# Patient Record
Sex: MALE
[De-identification: names, ages, dates, MRNs, and addresses within clinical notes are randomized per-mention and may not be internally consistent; named-entity substitution may affect disease eponyms.]

---

## 2017-02-02 ENCOUNTER — APPOINTMENT (OUTPATIENT)
Dept: ELECTROPHYSIOLOGY | Facility: CLINIC | Age: 62
End: 2017-02-02

## 2017-04-24 ENCOUNTER — APPOINTMENT (OUTPATIENT)
Dept: PULMONOLOGY | Facility: CLINIC | Age: 62
End: 2017-04-24

## 2017-04-24 VITALS
DIASTOLIC BLOOD PRESSURE: 71 MMHG | WEIGHT: 132 LBS | BODY MASS INDEX: 18.9 KG/M2 | HEART RATE: 53 BPM | HEIGHT: 70 IN | RESPIRATION RATE: 16 BRPM | TEMPERATURE: 97.2 F | SYSTOLIC BLOOD PRESSURE: 108 MMHG | OXYGEN SATURATION: 99 %

## 2017-04-24 DIAGNOSIS — R42 DIZZINESS AND GIDDINESS: ICD-10-CM

## 2017-04-24 DIAGNOSIS — R07.89 OTHER CHEST PAIN: ICD-10-CM

## 2017-04-24 DIAGNOSIS — Z01.818 ENCOUNTER FOR OTHER PREPROCEDURAL EXAMINATION: ICD-10-CM

## 2017-04-24 RX ORDER — ALBUTEROL SULFATE 90 UG/1
108 (90 BASE) AEROSOL, METERED RESPIRATORY (INHALATION)
Refills: 0 | Status: ACTIVE | COMMUNITY

## 2017-04-28 ENCOUNTER — APPOINTMENT (OUTPATIENT)
Dept: PULMONOLOGY | Facility: CLINIC | Age: 62
End: 2017-04-28

## 2017-07-06 ENCOUNTER — MEDICATION RENEWAL (OUTPATIENT)
Age: 62
End: 2017-07-06

## 2017-07-10 ENCOUNTER — APPOINTMENT (OUTPATIENT)
Dept: ORTHOPEDIC SURGERY | Facility: CLINIC | Age: 62
End: 2017-07-10

## 2017-07-24 ENCOUNTER — APPOINTMENT (OUTPATIENT)
Dept: ORTHOPEDIC SURGERY | Facility: CLINIC | Age: 62
End: 2017-07-24

## 2017-11-06 ENCOUNTER — APPOINTMENT (OUTPATIENT)
Dept: PULMONOLOGY | Facility: CLINIC | Age: 62
End: 2017-11-06
Payer: MEDICAID

## 2017-11-06 VITALS
SYSTOLIC BLOOD PRESSURE: 99 MMHG | RESPIRATION RATE: 16 BRPM | HEIGHT: 70 IN | WEIGHT: 132 LBS | HEART RATE: 60 BPM | TEMPERATURE: 97.4 F | OXYGEN SATURATION: 100 % | BODY MASS INDEX: 18.9 KG/M2 | DIASTOLIC BLOOD PRESSURE: 64 MMHG

## 2017-11-06 DIAGNOSIS — R06.02 SHORTNESS OF BREATH: ICD-10-CM

## 2017-11-06 DIAGNOSIS — K21.9 GASTRO-ESOPHAGEAL REFLUX DISEASE W/OUT ESOPHAGITIS: ICD-10-CM

## 2017-11-06 DIAGNOSIS — I45.6 PRE-EXCITATION SYNDROME: ICD-10-CM

## 2017-11-06 LAB
BASE EXCESS BLDA CALC-SCNC: 2.1
BLOOD GAS COMMENTS ARTERIAL: NORMAL
GAS PNL BLDA: NORMAL
HCO3 BLDA-SCNC: 25.9
HOROWITZ INDEX BLDA+IHG-RTO: NORMAL
PCO2 BLDA: 38.9
PH BLDA: 7.44
PO2 BLDA: 92
SAO2 % BLDA: NORMAL

## 2017-11-06 PROCEDURE — 94060 EVALUATION OF WHEEZING: CPT

## 2017-11-06 PROCEDURE — 36600 WITHDRAWAL OF ARTERIAL BLOOD: CPT | Mod: 59

## 2017-11-06 PROCEDURE — 94727 GAS DIL/WSHOT DETER LNG VOL: CPT

## 2017-11-06 PROCEDURE — 82803 BLOOD GASES ANY COMBINATION: CPT

## 2017-11-06 PROCEDURE — 99215 OFFICE O/P EST HI 40 MIN: CPT | Mod: 25

## 2017-11-06 PROCEDURE — 94729 DIFFUSING CAPACITY: CPT

## 2017-11-06 RX ORDER — LEVALBUTEROL TARTRATE 45 UG/1
45 AEROSOL, METERED ORAL TWICE DAILY
Qty: 1 | Refills: 3 | Status: ACTIVE | COMMUNITY
Start: 2017-11-06 | End: 1900-01-01

## 2017-11-20 ENCOUNTER — APPOINTMENT (OUTPATIENT)
Dept: ORTHOPEDIC SURGERY | Facility: CLINIC | Age: 62
End: 2017-11-20
Payer: MEDICAID

## 2017-11-20 VITALS — DIASTOLIC BLOOD PRESSURE: 71 MMHG | SYSTOLIC BLOOD PRESSURE: 119 MMHG | HEART RATE: 51 BPM

## 2017-11-20 DIAGNOSIS — M51.36 OTHER INTERVERTEBRAL DISC DEGENERATION, LUMBAR REGION: ICD-10-CM

## 2017-11-20 PROCEDURE — 99214 OFFICE O/P EST MOD 30 MIN: CPT

## 2017-12-08 ENCOUNTER — RX RENEWAL (OUTPATIENT)
Age: 62
End: 2017-12-08

## 2018-01-22 ENCOUNTER — APPOINTMENT (OUTPATIENT)
Dept: ORTHOPEDIC SURGERY | Facility: CLINIC | Age: 63
End: 2018-01-22

## 2020-11-20 ENCOUNTER — APPOINTMENT (OUTPATIENT)
Dept: PEDIATRIC ALLERGY IMMUNOLOGY | Facility: CLINIC | Age: 65
End: 2020-11-20
Payer: SELF-PAY

## 2020-11-20 VITALS — HEART RATE: 71 BPM | WEIGHT: 123.39 LBS | BODY MASS INDEX: 17.71 KG/M2

## 2020-11-20 DIAGNOSIS — Z83.79 FAMILY HISTORY OF OTHER DISEASES OF THE DIGESTIVE SYSTEM: ICD-10-CM

## 2020-11-20 DIAGNOSIS — L20.9 ATOPIC DERMATITIS, UNSPECIFIED: ICD-10-CM

## 2020-11-20 DIAGNOSIS — Z56.0 UNEMPLOYMENT, UNSPECIFIED: ICD-10-CM

## 2020-11-20 DIAGNOSIS — Z78.9 OTHER SPECIFIED HEALTH STATUS: ICD-10-CM

## 2020-11-20 PROCEDURE — 99499A: CUSTOM | Mod: NC

## 2020-11-20 RX ORDER — MOMETASONE FUROATE 100 UG/1
100 AEROSOL RESPIRATORY (INHALATION)
Refills: 0 | Status: DISCONTINUED | COMMUNITY
Start: 2017-04-24 | End: 2020-11-20

## 2020-11-20 RX ORDER — MONTELUKAST 10 MG/1
10 TABLET, FILM COATED ORAL
Qty: 30 | Refills: 3 | Status: DISCONTINUED | COMMUNITY
Start: 2017-04-24 | End: 2020-11-20

## 2020-11-20 SDOH — ECONOMIC STABILITY - INCOME SECURITY: UNEMPLOYMENT, UNSPECIFIED: Z56.0

## 2020-11-23 PROBLEM — L20.9 ATOPIC DERMATITIS: Status: ACTIVE | Noted: 2020-11-23

## 2020-11-23 NOTE — REASON FOR VISIT
[Initial Consultation] : an initial consultation for [Abnormal EGD/EoE] : abnormal esophagogastroduodenoscopy and or eosinophilic esophagitis [Congestion] : congestion [Itchy Eyes] : itchy eyes [Asthma] : asthma [Eczema] : eczema [Study Visit] : study visit [FreeTextEntry2] : regurgitation of food

## 2020-11-23 NOTE — REVIEW OF SYSTEMS
[Rhinorrhea] : rhinorrhea [Nasal Congestion] : nasal congestion [Atopic Dermatitis] : atopic dermatitis [Nl] : Genitourinary [Fatigue] : no fatigue [Fever] : no fever [Wgt Loss (___ Lbs)] : no recent weight loss [Dry Eyes] : no dryness ~T of the eyes [Vomiting] : no vomiting [Diarrhea] : no diarrhea [Fainting (Syncope)] : no fainting [Seizure] : no seizures [Easy Bruising] : no tendency for easy bruising [Swollen Glands] : no lymphadenopathy [Jitteriness] : no jitteriness [Recurrent Sinus Infections] : no recurrent sinus infections [Recurrent Ear Infections] : no recurrence or ear infections [FreeTextEntry2] : recent weight issues due to multiple problems.  [FreeTextEntry5] : see HPI [FreeTextEntry6] : see HPI [FreeTextEntry7] : see HPI [FreeTextEntry9] : back pain  [de-identified] : see HPI

## 2020-11-23 NOTE — HISTORY OF PRESENT ILLNESS
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  Patient comes in today to consider enrollment in clinical trial with Perla' Siglec 8 antibody for Eosinophilic Esophagitis.\par There is some evidence of difficulty breathing as far back as he can remember, since the age of at least 22 year or earlier.  This was thought to be asthma that has been under intermittent control since then.  The patient was seen by pulmonology in the past, but has been lost to follow up.  He thinks he was also told that he has chronic obstructive pulmonary disease. He uses Ventolin as needed but does not take a controller medication routinely. There is also associated use of albuterol at least once or twice a week.\par \par There was associated ongoing nasal congestion also as far back as patient can remember and was seen by ENT doctors in the past and was told that he has allergic rhinitis. No use of routine allergy medications is stated. \par \par Mr. Pickard has had pain with eating food has been present since the early 20s.  The patient was seen last by GI in 2007 and had an endoscopy with a doctor at [facility], with an associated diagnosis of achalasia. A pH probe was done but did not show reflux but was diagnosed with achalasia.  Then in 2017, an EGD was done that showed eosinophilic esophagitis. There is associated sensation of early satiety, regurgitation of food and feeling of acid in the throat. Currently, the patient has dysphagia with every meal and there is a sensation of food getting stuck with every meal. However, he had not had any impactions in the recent past. Several foods are limited in the diet because the patient feels the esophageal symptoms are worsened by those foods- listed below\par \par Patient also has nasal polyps that were diagnosed in the past. But no surgical correction has been done. The patient has no problems taking aspirin. Takes TUMS almost every day. \par \par Associated eczema on the foot and ankle is bothersome many times but application of topical emollients helps.  [de-identified] : beans, some type cookies, banana, milk, milk products, soy, nuts, peanut (voluntary), tree nuts (voluntary) [de-identified] : eggs, wheat, fish, shellfish

## 2020-11-23 NOTE — SOCIAL HISTORY
[House] : [unfilled] lives in a house  [Dog] : dog [FreeTextEntry2] : Unemployed, but will start employment imminently. [Smokers in Household] : there are no smokers in the home

## 2020-11-23 NOTE — PHYSICAL EXAM
[Alert] : alert [Well Nourished] : well nourished [No Acute Distress] : no acute distress [Well Developed] : well developed [Normal Pupil & Iris Size/Symmetry] : normal pupil and iris size and symmetry [No Discharge] : no discharge [Sclera Not Icteric] : sclera not icteric [Normal Outer Ear/Nose] : the ears and nose were normal in appearance [No Nasal Discharge] : no nasal discharge [No Thrush] : no thrush [Supple] : the neck was supple [Normal Rate and Effort] : normal respiratory rhythm and effort [No Crackles] : no crackles [No Retractions] : no retractions [Normal Rate] : heart rate was normal  [Normal S1, S2] : normal S1 and S2 [No murmur] : no murmur [Regular Rhythm] : with a regular rhythm [Soft] : abdomen soft [Not Distended] : not distended [Normal Cervical Lymph Nodes] : cervical [Skin Intact] : skin intact  [No Rash] : no rash [No Skin Lesions] : no skin lesions [Alert, Awake, Oriented as Age-Appropriate] : alert, awake, oriented as age appropriate [Conjunctival Erythema] : no conjunctival erythema [Boggy Nasal Turbinates] : no boggy and/or pale nasal turbinates [Posterior Pharyngeal Cobblestoning] : no posterior pharyngeal cobblestoning [Wheezing] : no wheezing was heard [Eczematous Patches] : no eczematous patches [de-identified] : thin

## 2021-01-29 ENCOUNTER — APPOINTMENT (OUTPATIENT)
Dept: PEDIATRIC ALLERGY IMMUNOLOGY | Facility: CLINIC | Age: 66
End: 2021-01-29
Payer: COMMERCIAL

## 2021-01-29 VITALS
OXYGEN SATURATION: 98 % | BODY MASS INDEX: 18.51 KG/M2 | HEART RATE: 65 BPM | WEIGHT: 125 LBS | HEIGHT: 69 IN | SYSTOLIC BLOOD PRESSURE: 107 MMHG | TEMPERATURE: 97.1 F | DIASTOLIC BLOOD PRESSURE: 61 MMHG

## 2021-01-29 DIAGNOSIS — J31.0 CHRONIC RHINITIS: ICD-10-CM

## 2021-01-29 DIAGNOSIS — J44.9 CHRONIC OBSTRUCTIVE PULMONARY DISEASE, UNSPECIFIED: ICD-10-CM

## 2021-01-29 DIAGNOSIS — I45.6 PRE-EXCITATION SYNDROME: ICD-10-CM

## 2021-01-29 DIAGNOSIS — R13.10 DYSPHAGIA, UNSPECIFIED: ICD-10-CM

## 2021-01-29 PROCEDURE — 95004 PERQ TESTS W/ALRGNC XTRCS: CPT

## 2021-01-29 PROCEDURE — 99072 ADDL SUPL MATRL&STAF TM PHE: CPT

## 2021-01-29 PROCEDURE — 99215 OFFICE O/P EST HI 40 MIN: CPT | Mod: 25

## 2021-01-29 RX ORDER — ALBUTEROL SULFATE 90 UG/1
108 (90 BASE) AEROSOL, METERED RESPIRATORY (INHALATION)
Qty: 1 | Refills: 0 | Status: ACTIVE | COMMUNITY
Start: 2021-01-29 | End: 1900-01-01

## 2021-01-29 RX ORDER — CETIRIZINE HYDROCHLORIDE 10 MG/1
10 CAPSULE, LIQUID FILLED ORAL
Qty: 30 | Refills: 2 | Status: ACTIVE | COMMUNITY
Start: 2021-01-29 | End: 1900-01-01

## 2021-01-29 RX ORDER — AZELASTINE HYDROCHLORIDE 137 UG/1
0.1 SPRAY, METERED NASAL TWICE DAILY
Qty: 1 | Refills: 1 | Status: ACTIVE | COMMUNITY
Start: 2021-01-29 | End: 1900-01-01

## 2021-02-01 PROBLEM — I45.6 WOLFF-PARKINSON-WHITE (WPW) SYNDROME: Status: ACTIVE | Noted: 2020-11-20

## 2021-02-01 PROBLEM — J44.9 COPD, MODERATE: Status: ACTIVE | Noted: 2017-04-24

## 2021-02-01 PROBLEM — J31.0 NON-ALLERGIC RHINITIS: Status: ACTIVE | Noted: 2021-02-01

## 2021-02-01 PROBLEM — R13.10 DYSPHAGIA: Status: ACTIVE | Noted: 2020-11-23

## 2021-02-01 NOTE — REVIEW OF SYSTEMS
[Vomiting] : no vomiting [Fainting (Syncope)] : no fainting [Seizure] : no seizures [Atopic Dermatitis] : atopic dermatitis [Nl] : Cardiovascular [FreeTextEntry4] : see HPI [FreeTextEntry6] : asthma [FreeTextEntry7] : see HPI

## 2021-02-01 NOTE — PHYSICAL EXAM
[Alert] : alert [Well Nourished] : well nourished [No Acute Distress] : no acute distress [Well Developed] : well developed [No Discharge] : no discharge [No Photophobia] : no photophobia [Conjunctival Erythema] : no conjunctival erythema [Normal Outer Ear/Nose] : the ears and nose were normal in appearance [No Nasal Discharge] : no nasal discharge [Boggy Nasal Turbinates] : boggy and/or pale nasal turbinates [Supple] : the neck was supple [Normal Rate and Effort] : normal respiratory rhythm and effort [No Crackles] : no crackles [No Retractions] : no retractions [Wheezing] : no wheezing was heard [Normal Rate] : heart rate was normal  [Regular Rhythm] : with a regular rhythm [Skin Intact] : skin intact  [No Rash] : no rash [No clubbing] : no clubbing [No Edema] : no edema [Judgment and Insight Age Appropriate] : judgement and insight is age appropriate [Alert, Awake, Oriented as Age-Appropriate] : alert, awake, oriented as age appropriate [de-identified] : right turbinate with shiny pinkish/bluish diffuse tissue, with no nasal opening -4+; left 3+

## 2021-02-01 NOTE — IMPRESSION
[Allergy Testing Dust Mite] : dust mites [Allergy Testing Mixed Feathers] : feathers [Allergy Testing Cockroach] : cockroach [Allergy Testing Dog] : dog [Allergy Testing Cat] : cat [Allergy Testing Trees] : trees [Allergy Testing Weeds] : weeds [Allergy Testing Grasses] : grasses [] : wheat [________] : [unfilled]

## 2021-02-08 ENCOUNTER — APPOINTMENT (OUTPATIENT)
Dept: FAMILY MEDICINE | Facility: CLINIC | Age: 66
End: 2021-02-08
Payer: COMMERCIAL

## 2021-02-08 VITALS
BODY MASS INDEX: 18.51 KG/M2 | TEMPERATURE: 98 F | HEIGHT: 69 IN | HEART RATE: 62 BPM | DIASTOLIC BLOOD PRESSURE: 70 MMHG | SYSTOLIC BLOOD PRESSURE: 110 MMHG | WEIGHT: 125 LBS | OXYGEN SATURATION: 98 %

## 2021-02-08 DIAGNOSIS — R32 UNSPECIFIED URINARY INCONTINENCE: ICD-10-CM

## 2021-02-08 DIAGNOSIS — R21 RASH AND OTHER NONSPECIFIC SKIN ERUPTION: ICD-10-CM

## 2021-02-08 DIAGNOSIS — K59.00 CONSTIPATION, UNSPECIFIED: ICD-10-CM

## 2021-02-08 DIAGNOSIS — J45.909 UNSPECIFIED ASTHMA, UNCOMPLICATED: ICD-10-CM

## 2021-02-08 DIAGNOSIS — H61.23 IMPACTED CERUMEN, BILATERAL: ICD-10-CM

## 2021-02-08 DIAGNOSIS — N52.9 MALE ERECTILE DYSFUNCTION, UNSPECIFIED: ICD-10-CM

## 2021-02-08 PROCEDURE — 99072 ADDL SUPL MATRL&STAF TM PHE: CPT

## 2021-02-08 PROCEDURE — 99204 OFFICE O/P NEW MOD 45 MIN: CPT

## 2021-02-08 RX ORDER — OMEPRAZOLE 40 MG/1
40 CAPSULE, DELAYED RELEASE ORAL
Qty: 30 | Refills: 3 | Status: ACTIVE | COMMUNITY
Start: 2021-02-08 | End: 1900-01-01

## 2021-02-08 NOTE — PLAN
[FreeTextEntry1] : Cerumen impaction\par - irrigation performed BL. Pt tolerated well\par \par GERD\par - start omeprazole\par - recommend GI f/u for EGD\par - also due for colonoscopy\par \par Asthma\par - slight wheeze noted\par - triggered by cold\par - albuterol PRN\par \par Foot rash\par - podiatry referral\par \par Nasal polyps\par - ENT referral\par \par Constipation\par - trial of miralax\par - increase fiber intake\par \par Incontinence/ ED\par - urology referral\par \par Advised pt to f/u more frequently with PCP as it is difficult to address multiple issues during one visit. He is agreeable. \par

## 2021-02-08 NOTE — HISTORY OF PRESENT ILLNESS
[FreeTextEntry8] : 65 year old male presents to establish care with multiple concerns today.\par He complains of clogged ears and difficulty hearing. \par He complains of rash on his feet. Has tried lamisil which has not helped. States rash is itchy, not painful.\par He complains of nasal polyps. Has been followed by allergist. States they worsen in the cold. It has been suggested he get surgery done to remove them but he is hesitant.\par He also complains of chronic GERD and states that worsens the nasal polyps. He is not currently on reflux medication. He has not seen a GI doctor recently. Last colonoscopy 12 years ago. \par He also complains of constipation. Has chronic constipation and states eating healthy foods/vegetables help \par He complains of urinary incontinence and ED

## 2021-02-08 NOTE — PHYSICAL EXAM
[No Acute Distress] : no acute distress [Well-Appearing] : well-appearing [Normal Sclera/Conjunctiva] : normal sclera/conjunctiva [PERRL] : pupils equal round and reactive to light [Normal Outer Ear/Nose] : the outer ears and nose were normal in appearance [Normal TMs] : both tympanic membranes were normal [No Respiratory Distress] : no respiratory distress  [No Accessory Muscle Use] : no accessory muscle use [Normal Rate] : normal rate  [Regular Rhythm] : with a regular rhythm [Soft] : abdomen soft [Non Tender] : non-tender [Non-distended] : non-distended [Normal Bowel Sounds] : normal bowel sounds [No Rash] : no rash [Normal Affect] : the affect was normal [Normal Insight/Judgement] : insight and judgment were intact [de-identified] : slight wheeze BL in upper airways

## 2021-02-23 DIAGNOSIS — R89.9 UNSPECIFIED ABNORMAL FINDING IN SPECIMENS FROM OTHER ORGANS, SYSTEMS AND TISSUES: ICD-10-CM

## 2021-02-23 LAB
BASOPHILS # BLD AUTO: 0.04 K/UL
BASOPHILS NFR BLD AUTO: 0.7 %
DEPRECATED KAPPA LC FREE/LAMBDA SER: 1.27 RATIO
EOSINOPHIL # BLD AUTO: 0.19 K/UL
EOSINOPHIL NFR BLD AUTO: 3.4 %
HCT VFR BLD CALC: 39.6 %
HGB BLD-MCNC: 12.6 G/DL
IGA SER QL IEP: 228 MG/DL
IGG SER QL IEP: 1145 MG/DL
IGM SER QL IEP: 55 MG/DL
IMM GRANULOCYTES NFR BLD AUTO: 0.2 %
KAPPA LC CSF-MCNC: 1.26 MG/DL
KAPPA LC SERPL-MCNC: 1.6 MG/DL
LEUKOTRIENE E4, URINE: 108 CD:455662145
LYMPHOCYTES # BLD AUTO: 1.5 K/UL
LYMPHOCYTES NFR BLD AUTO: 26.5 %
MAN DIFF?: NORMAL
MANNAN BINDING LECTIN (MBL): >4000 NG/ML
MCHC RBC-ENTMCNC: 30.1 PG
MCHC RBC-ENTMCNC: 31.8 GM/DL
MCV RBC AUTO: 94.7 FL
MONOCYTES # BLD AUTO: 0.47 K/UL
MONOCYTES NFR BLD AUTO: 8.3 %
NEUTROPHILS # BLD AUTO: 3.44 K/UL
NEUTROPHILS NFR BLD AUTO: 60.9 %
PLATELET # BLD AUTO: 172 K/UL
RBC # BLD: 4.18 M/UL
RBC # FLD: 13.4 %
TOTAL IGE SMQN RAST: 78 KU/L
TRYPTASE: 5.8 UG/L
WBC # FLD AUTO: 5.65 K/UL

## 2021-03-02 ENCOUNTER — RX RENEWAL (OUTPATIENT)
Age: 66
End: 2021-03-02

## 2021-03-02 RX ORDER — ALBUTEROL SULFATE 90 UG/1
108 (90 BASE) INHALANT RESPIRATORY (INHALATION)
Qty: 1 | Refills: 0 | Status: ACTIVE | COMMUNITY
Start: 2021-03-02 | End: 1900-01-01

## 2021-03-11 ENCOUNTER — APPOINTMENT (OUTPATIENT)
Dept: UROLOGY | Facility: CLINIC | Age: 66
End: 2021-03-11

## 2021-03-22 ENCOUNTER — TRANSCRIPTION ENCOUNTER (OUTPATIENT)
Age: 66
End: 2021-03-22

## 2021-05-03 ENCOUNTER — TRANSCRIPTION ENCOUNTER (OUTPATIENT)
Age: 66
End: 2021-05-03

## 2021-05-05 ENCOUNTER — TRANSCRIPTION ENCOUNTER (OUTPATIENT)
Age: 66
End: 2021-05-05

## 2021-05-07 ENCOUNTER — NON-APPOINTMENT (OUTPATIENT)
Age: 66
End: 2021-05-07

## 2021-05-07 ENCOUNTER — APPOINTMENT (OUTPATIENT)
Dept: ELECTROPHYSIOLOGY | Facility: CLINIC | Age: 66
End: 2021-05-07
Payer: COMMERCIAL

## 2021-05-07 VITALS
DIASTOLIC BLOOD PRESSURE: 69 MMHG | HEIGHT: 69 IN | HEART RATE: 58 BPM | BODY MASS INDEX: 17.77 KG/M2 | SYSTOLIC BLOOD PRESSURE: 115 MMHG | WEIGHT: 120 LBS | OXYGEN SATURATION: 98 %

## 2021-05-07 DIAGNOSIS — Z87.09 PERSONAL HISTORY OF OTHER DISEASES OF THE RESPIRATORY SYSTEM: ICD-10-CM

## 2021-05-07 DIAGNOSIS — R55 SYNCOPE AND COLLAPSE: ICD-10-CM

## 2021-05-07 DIAGNOSIS — R00.2 PALPITATIONS: ICD-10-CM

## 2021-05-07 DIAGNOSIS — I49.9 CARDIAC ARRHYTHMIA, UNSPECIFIED: ICD-10-CM

## 2021-05-07 DIAGNOSIS — K22.0 ACHALASIA OF CARDIA: ICD-10-CM

## 2021-05-07 PROCEDURE — 99204 OFFICE O/P NEW MOD 45 MIN: CPT

## 2021-05-08 PROBLEM — K22.0 ACHALASIA: Status: RESOLVED | Noted: 2017-04-24 | Resolved: 2020-11-20

## 2021-05-08 NOTE — PHYSICAL EXAM
[Normal Conjunctiva] : normal conjunctiva [No Xanthelasma] : no xanthelasma [Normal Venous Pressure] : normal venous pressure [No Carotid Bruit] : no carotid bruit [Normal S1, S2] : normal S1, S2 [No Murmur] : no murmur [Clear Lung Fields] : clear lung fields [Good Air Entry] : good air entry [Soft] : abdomen soft [Non Tender] : non-tender [Normal Gait] : normal gait [No Edema] : no edema [No Cyanosis] : no cyanosis [No Clubbing] : no clubbing [No Rash] : no rash [No Skin Lesions] : no skin lesions [Moves all extremities] : moves all extremities [No Focal Deficits] : no focal deficits [Alert and Oriented] : alert and oriented [de-identified] : Very lean [de-identified] : Pectus

## 2021-05-08 NOTE — DISCUSSION/SUMMARY
[FreeTextEntry1] : He may have either SVT, PAF or both. Idiopathic VT possible but less likely. Contrary to previous evaluations, I have not seen any evidence for WPW from the records available to me. I have given him a Biotel monitor to better make a diagnosis. He has normal carotid upstrokes and no significant murmur, and is unlikely to have significant structural heart disease. Resting pulse is 60 so not much room for AA agents. Once a definitive diagnosis is made, catheter ablation will likely be the best theraapeutic option.

## 2021-05-08 NOTE — REVIEW OF SYSTEMS
[Fever] : no fever [Headache] : no headache [Chills] : no chills [Feeling Fatigued] : not feeling fatigued [Weight Loss (___ Lbs)] : [unfilled] ~Ulb weight loss [Blurry Vision] : no blurred vision [Seeing Double (Diplopia)] : no diplopia [Earache] : no earache [SOB] : no shortness of breath [Dyspnea on exertion] : not dyspnea during exertion [Chest Discomfort] : no chest discomfort [Lower Ext Edema] : no extremity edema [Leg Claudication] : no intermittent leg claudication [Palpitations] : palpitations [Orthopnea] : no orthopnea [Syncope] : no syncope [Cough] : no cough [Wheezing] : wheezing [Coughing Up Blood] : no hemoptysis [Abdominal Pain] : no abdominal pain [Nausea] : no nausea [Change in Appetite] : change in appetite [Change In The Stool] : no change in stool [Constipation] : no constipation [Blood in stool] : no blood in stoo [Urinary Frequency] : no change in urinary frequency [Joint Pain] : no joint pain [Hematuria] : no hematuria [Joint Swelling] : no joint swelling [Rash] : no rash [Skin Lesions] : no skin lesions [Dizziness] : dizziness [Convulsions] : no convulsions [Limb Weakness (Paresis)] : no limb weakness (Paresis) [Confusion] : no confusion was observed [Memory Lapses Or Loss] : no memory lapses or loss [Under Stress] : not under stress [Easy Bleeding] : no tendency for easy bleeding

## 2021-05-12 LAB
2,3-DINOR 11B-PROSTAGLANDIN F2A, 24 HR URINE: 1661 CD:455662145
COLLECT DURATION TIME UR: 24 H
CREAT UR-MCNC: 63 MG/DL
CREATININE, 24 HOUR URINE: 1859 MG/24 H
ME-HISTAMINE/CREAT 24H UR: 146 MCG/G CR
SPECIMEN VOL 24H UR: 2950 ML

## 2021-05-19 ENCOUNTER — APPOINTMENT (OUTPATIENT)
Dept: FAMILY MEDICINE | Facility: CLINIC | Age: 66
End: 2021-05-19

## 2021-05-24 ENCOUNTER — APPOINTMENT (OUTPATIENT)
Dept: CARDIOLOGY | Facility: CLINIC | Age: 66
End: 2021-05-24

## 2021-06-03 ENCOUNTER — APPOINTMENT (OUTPATIENT)
Dept: CARDIOLOGY | Facility: CLINIC | Age: 66
End: 2021-06-03

## 2021-06-07 PROCEDURE — 93228 REMOTE 30 DAY ECG REV/REPORT: CPT

## 2021-08-19 ENCOUNTER — APPOINTMENT (OUTPATIENT)
Dept: PEDIATRIC ALLERGY IMMUNOLOGY | Facility: CLINIC | Age: 66
End: 2021-08-19
Payer: MEDICAID

## 2021-08-19 PROCEDURE — 99215 OFFICE O/P EST HI 40 MIN: CPT | Mod: 95

## 2021-08-24 NOTE — HISTORY OF PRESENT ILLNESS
[Home] : at home, [unfilled] , at the time of the visit. [Other Location: e.g. Home (Enter Location, City,State)___] : at [unfilled] [de-identified] : Patient feels he is worse than he used to be.\par Dizziness- has been dizzy here and there for the last several days although this has been present for many years- was present in 2016 when was evaluated for it as well. The patient has spoke to his PCP and work up has included Cardiology.  \par Shortness of breath (SOB)- also present for many years, but feels bothersome now.  Seen by Pulmonology in the past, but has not discussed this recently with them. Walking at a slow pace was associated with SOB. Seen last by Cardiology several years ago.\par However, patient is able to play tennis routinely without a problem.\par Palpitations have also occurred here and there for a long time\par 18 years ago, was seen by electro-cardiologist from [facility] and "heart monitor" was placed and no issues were identified. \par Patient also states that he also has shortness of breath when he lays down.  He feels that there is heart discomfort. \par Patient also states that he was told he has achalasia, reflux and dysphagia with history of having endoscopy in the past. In the past, EGD found some H.Pylori but that was a while ago.  When last seen at [facility], a manometry was recommended and patient states that this testing showed evidence of Achalasia and patient was treated with PPIs, which were not helpful. Another doctor has recommended an evaluation of reflux. \par \par

## 2021-08-24 NOTE — REVIEW OF SYSTEMS
[Nl] : Integumentary [Eye Discharge] : no eye discharge [Puffy Eyelids] : no puffy ~T eyelids [FreeTextEntry4] : see HPI [FreeTextEntry5] : see HPI [FreeTextEntry6] : see HPI [FreeTextEntry7] : see HPI

## 2021-08-24 NOTE — PHYSICAL EXAM
[Alert] : alert [No Acute Distress] : no acute distress [Well Developed] : well developed [No Discharge] : no discharge [Normal Outer Ear/Nose] : the ears and nose were normal in appearance [No Nasal Discharge] : no nasal discharge [Judgment and Insight Age Appropriate] : judgement and insight is age appropriate [Alert, Awake, Oriented as Age-Appropriate] : alert, awake, oriented as age appropriate [Conjunctival Erythema] : no conjunctival erythema

## 2021-08-25 ENCOUNTER — INPATIENT (INPATIENT)
Facility: HOSPITAL | Age: 66
LOS: 2 days | Discharge: HOME | DRG: 392 | End: 2021-08-28
Attending: STUDENT IN AN ORGANIZED HEALTH CARE EDUCATION/TRAINING PROGRAM | Admitting: STUDENT IN AN ORGANIZED HEALTH CARE EDUCATION/TRAINING PROGRAM
Payer: MEDICAID

## 2021-08-25 VITALS
RESPIRATION RATE: 18 BRPM | HEART RATE: 62 BPM | OXYGEN SATURATION: 97 % | DIASTOLIC BLOOD PRESSURE: 79 MMHG | SYSTOLIC BLOOD PRESSURE: 151 MMHG | HEIGHT: 65 IN | WEIGHT: 149.91 LBS | TEMPERATURE: 98 F

## 2021-08-25 DIAGNOSIS — Z01.818 ENCOUNTER FOR OTHER PREPROCEDURAL EXAMINATION: Chronic | ICD-10-CM

## 2021-08-25 DIAGNOSIS — Z98.890 OTHER SPECIFIED POSTPROCEDURAL STATES: Chronic | ICD-10-CM

## 2021-08-25 LAB
ALBUMIN SERPL ELPH-MCNC: 4.3 G/DL — SIGNIFICANT CHANGE UP (ref 3.3–5)
ALP SERPL-CCNC: 65 U/L — SIGNIFICANT CHANGE UP (ref 40–120)
ALT FLD-CCNC: 18 U/L — SIGNIFICANT CHANGE UP (ref 10–45)
ANION GAP SERPL CALC-SCNC: 12 MMOL/L — SIGNIFICANT CHANGE UP (ref 5–17)
AST SERPL-CCNC: 29 U/L — SIGNIFICANT CHANGE UP (ref 10–40)
BASE EXCESS BLDV CALC-SCNC: 5.7 MMOL/L — HIGH (ref -2–2)
BASOPHILS # BLD AUTO: 0.05 K/UL — SIGNIFICANT CHANGE UP (ref 0–0.2)
BASOPHILS NFR BLD AUTO: 0.8 % — SIGNIFICANT CHANGE UP (ref 0–2)
BILIRUB SERPL-MCNC: 0.4 MG/DL — SIGNIFICANT CHANGE UP (ref 0.2–1.2)
BUN SERPL-MCNC: 15 MG/DL — SIGNIFICANT CHANGE UP (ref 7–23)
CA-I SERPL-SCNC: 1.27 MMOL/L — SIGNIFICANT CHANGE UP (ref 1.15–1.33)
CALCIUM SERPL-MCNC: 9.7 MG/DL — SIGNIFICANT CHANGE UP (ref 8.4–10.5)
CHLORIDE BLDV-SCNC: 102 MMOL/L — SIGNIFICANT CHANGE UP (ref 96–108)
CHLORIDE SERPL-SCNC: 103 MMOL/L — SIGNIFICANT CHANGE UP (ref 96–108)
CO2 BLDV-SCNC: 35 MMOL/L — HIGH (ref 22–26)
CO2 SERPL-SCNC: 24 MMOL/L — SIGNIFICANT CHANGE UP (ref 22–31)
CREAT SERPL-MCNC: 0.74 MG/DL — SIGNIFICANT CHANGE UP (ref 0.5–1.3)
EOSINOPHIL # BLD AUTO: 0.24 K/UL — SIGNIFICANT CHANGE UP (ref 0–0.5)
EOSINOPHIL NFR BLD AUTO: 3.7 % — SIGNIFICANT CHANGE UP (ref 0–6)
GAS PNL BLDV: 138 MMOL/L — SIGNIFICANT CHANGE UP (ref 136–145)
GAS PNL BLDV: SIGNIFICANT CHANGE UP
GAS PNL BLDV: SIGNIFICANT CHANGE UP
GLUCOSE BLDV-MCNC: 95 MG/DL — SIGNIFICANT CHANGE UP (ref 70–99)
GLUCOSE SERPL-MCNC: 92 MG/DL — SIGNIFICANT CHANGE UP (ref 70–99)
HCO3 BLDV-SCNC: 33 MMOL/L — HIGH (ref 22–29)
HCT VFR BLD CALC: 39.4 % — SIGNIFICANT CHANGE UP (ref 39–50)
HCT VFR BLDA CALC: 40 % — SIGNIFICANT CHANGE UP (ref 39–51)
HGB BLD CALC-MCNC: 13.2 G/DL — SIGNIFICANT CHANGE UP (ref 12.6–17.4)
HGB BLD-MCNC: 12.8 G/DL — LOW (ref 13–17)
IMM GRANULOCYTES NFR BLD AUTO: 0.2 % — SIGNIFICANT CHANGE UP (ref 0–1.5)
LACTATE BLDV-MCNC: 0.8 MMOL/L — SIGNIFICANT CHANGE UP (ref 0.7–2)
LIDOCAIN IGE QN: 37 U/L — SIGNIFICANT CHANGE UP (ref 7–60)
LYMPHOCYTES # BLD AUTO: 2.06 K/UL — SIGNIFICANT CHANGE UP (ref 1–3.3)
LYMPHOCYTES # BLD AUTO: 32.1 % — SIGNIFICANT CHANGE UP (ref 13–44)
MAGNESIUM SERPL-MCNC: 2.4 MG/DL — SIGNIFICANT CHANGE UP (ref 1.6–2.6)
MCHC RBC-ENTMCNC: 30.1 PG — SIGNIFICANT CHANGE UP (ref 27–34)
MCHC RBC-ENTMCNC: 32.5 GM/DL — SIGNIFICANT CHANGE UP (ref 32–36)
MCV RBC AUTO: 92.7 FL — SIGNIFICANT CHANGE UP (ref 80–100)
MONOCYTES # BLD AUTO: 0.53 K/UL — SIGNIFICANT CHANGE UP (ref 0–0.9)
MONOCYTES NFR BLD AUTO: 8.3 % — SIGNIFICANT CHANGE UP (ref 2–14)
NEUTROPHILS # BLD AUTO: 3.52 K/UL — SIGNIFICANT CHANGE UP (ref 1.8–7.4)
NEUTROPHILS NFR BLD AUTO: 54.9 % — SIGNIFICANT CHANGE UP (ref 43–77)
NRBC # BLD: 0 /100 WBCS — SIGNIFICANT CHANGE UP (ref 0–0)
NT-PROBNP SERPL-SCNC: 163 PG/ML — SIGNIFICANT CHANGE UP (ref 0–300)
PCO2 BLDV: 58 MMHG — HIGH (ref 42–55)
PH BLDV: 7.36 — SIGNIFICANT CHANGE UP (ref 7.32–7.43)
PHOSPHATE SERPL-MCNC: 3.8 MG/DL — SIGNIFICANT CHANGE UP (ref 2.5–4.5)
PLATELET # BLD AUTO: 145 K/UL — LOW (ref 150–400)
PO2 BLDV: 21 MMHG — LOW (ref 25–45)
POTASSIUM BLDV-SCNC: 3.7 MMOL/L — SIGNIFICANT CHANGE UP (ref 3.5–5.1)
POTASSIUM SERPL-MCNC: 3.8 MMOL/L — SIGNIFICANT CHANGE UP (ref 3.5–5.3)
POTASSIUM SERPL-SCNC: 3.8 MMOL/L — SIGNIFICANT CHANGE UP (ref 3.5–5.3)
PROT SERPL-MCNC: 7 G/DL — SIGNIFICANT CHANGE UP (ref 6–8.3)
RBC # BLD: 4.25 M/UL — SIGNIFICANT CHANGE UP (ref 4.2–5.8)
RBC # FLD: 13.9 % — SIGNIFICANT CHANGE UP (ref 10.3–14.5)
SAO2 % BLDV: 28.6 % — LOW (ref 67–88)
SODIUM SERPL-SCNC: 139 MMOL/L — SIGNIFICANT CHANGE UP (ref 135–145)
TROPONIN T, HIGH SENSITIVITY RESULT: 8 NG/L — SIGNIFICANT CHANGE UP (ref 0–51)
WBC # BLD: 6.41 K/UL — SIGNIFICANT CHANGE UP (ref 3.8–10.5)
WBC # FLD AUTO: 6.41 K/UL — SIGNIFICANT CHANGE UP (ref 3.8–10.5)

## 2021-08-25 PROCEDURE — 71046 X-RAY EXAM CHEST 2 VIEWS: CPT | Mod: 26

## 2021-08-25 RX ORDER — FAMOTIDINE 10 MG/ML
20 INJECTION INTRAVENOUS ONCE
Refills: 0 | Status: COMPLETED | OUTPATIENT
Start: 2021-08-25 | End: 2021-08-25

## 2021-08-25 RX ADMIN — FAMOTIDINE 20 MILLIGRAM(S): 10 INJECTION INTRAVENOUS at 23:22

## 2021-08-25 RX ADMIN — Medication 30 MILLILITER(S): at 23:22

## 2021-08-25 NOTE — ED ADULT NURSE NOTE - TEMPLATE LIST FOR HEAD TO TOE ASSESSMENT
Marleni from [facility] called stating patient was seen in the ER earlier today, and is now back at the facility. There were no changes made except for medication management changes.  FYI sent to Dr Guillermo   Cardiac

## 2021-08-25 NOTE — ED PROVIDER NOTE - ATTENDING CONTRIBUTION TO CARE
Patient is a 66 yo M with history of GERD, SVT, asthma here for complaint of palpitations, chest discomfort and shortness of breath. He reports he has had intermittent palpitations, had an outpatient Holter monitor. He states he had chocolate today, which he thinks sparked his palpitations and triggered his GERD and shortness of breath. He also notes lower extremity swelling and irritation to his feet after soaking in an oat bath.    VS noted  Gen. no acute distress, appears winded when speaking  HEENT: EOMI, mmm  Lungs: CTAB/L no C/ W /R   CVS: RRR   Abd; Soft non tender, non distended   Ext: +1 bilateral pitting edema  Skin: macular blanching rash to bilateral feet  Neuro AAOx3 non focal clear speech  a/p: SOB/ palpitations - worse with laying flat. He has LE edema. Plan to evaluate for ACS/CHF. Rash likely 2/2 to irritation from oat bath.   - Nessa HEATH

## 2021-08-25 NOTE — ED PROVIDER NOTE - RAPID ASSESSMENT
65y M with PMHx of Acid Reflux, presents to the ED c/o palpitation, SOB, and dizziness x 1 month. Here today as it is worsening. No recent travel. Received covid vaccine.     INnacy), have documented this rapid assessment note under the dictation of Elías Martinez)), which has been reviewed and affirmed to be accurate.  Patient was seen as a QPA patient. The patient will be seen and further worked up in the main emergency department and their care will be completed by the main emergency department team along with a thorough physical exam. Receiving team will follow up on labs, analgesia, any clinical imaging, reassess and disposition as clinically indicated, all decisions regarding the progression of care will be made at their discretion. 65y M with PMHx of Acid Reflux, presents to the ED c/o palpitation, SOB, and dizziness x 1 month. Here today as it is worsening. No recent travel. Received covid vaccine.     Nancy PERALES (Arley), have documented this rapid assessment note under the dictation of Elías Martinez (PA)), which has been reviewed and affirmed to be accurate.  Patient was seen as a QPA patient. The patient will be seen and further worked up in the main emergency department and their care will be completed by the main emergency department team along with a thorough physical exam. Receiving team will follow up on labs, analgesia, any clinical imaging, reassess and disposition as clinically indicated, all decisions regarding the progression of care will be made at their discretion.  Elías PERALES, personally performed the service described in the documentation recorded by the cricketibkervin in my presence, and it accurately and completely records my words and actions.

## 2021-08-25 NOTE — ED PROVIDER NOTE - PROGRESS NOTE DETAILS
ANTONETTE Nelson (PGY-2) - labs normal. Has not had recent cardiac work up, will keep for cardiac stress testing.

## 2021-08-25 NOTE — ED PROVIDER NOTE - OBJECTIVE STATEMENT
65y M w/ PMHx of GERD, SVT, Asthma (Albuterol) presents to the ED c/o palpitations, SOB, CP, cough x1 month. States that SOB has started since 2017 and that his palpitations do come and go sometimes. Reports noticing swelling of legs since yesterday. Also c/o red itchy spots around feet x6 months since switching soaps. States that he ate chocolate earlier today, which may have triggered his palpitations, GERD, and asthma. Denies cigarette use since he was a child. 65y M w/ PMHx of GERD, SVT, Asthma (Albuterol) presents to the ED c/o palpitations, SOB, CP, cough x1 month. States that SOB has started since 2017 and that his palpitations do come and go sometimes. Reports noticing swelling of legs since yesterday. Also c/o red itchy spots around feet x6 months since using oat soap. States that he ate chocolate earlier today, which may have triggered his palpitations as it worsens his GERD. Remote cigarette use. 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

## 2021-08-25 NOTE — ED PROVIDER NOTE - CLINICAL SUMMARY MEDICAL DECISION MAKING FREE TEXT BOX
Elderly pt previous SVTs, chronic palpitations here for cp, sob worse w/ laying flat, and leg edema. Concern for acs vs CHF vs COPD. Feet exam findings likely dermatitis 2/2 soaps. Labs, cardiac markers, ekg, cxr, GI cocktail. Reassess.

## 2021-08-25 NOTE — ED ADULT NURSE NOTE - OBJECTIVE STATEMENT
65 year old male c/o palpitations. PMH includes GERD, SVT, asthma. Pt A+Ox3, reports intermittent palpitations, SOB, chest pain, and cough x1 month. Pt reports eating chocolate earlier today, which may have triggered his GERD and palpitations. Pt also endorses intermittent difficulty swallowing. Upon assessment, pt presents well-appearing. Pt denies headache, dizziness, abdominal pain, N/V, urinary or bowel symptoms, fevers or chills. EKG completed, pt attached to cardiac monitor.

## 2021-08-25 NOTE — ED PROVIDER NOTE - PHYSICAL EXAMINATION
General: NAD  HEENT: NCAT, PERRL  Cardiac: RRR, no murmurs, 2+ peripheral pulses  Chest: CTA  Abdomen: soft, non-distended, bowel sounds present, no ttp, no rebound or guarding  Extremities: +trace pitting edema of anterior tibfib -calf tenderness or size discrepancy +bilateral feet w/ blanchable erythema  Skin: no other rashes  Neuro: AAOx3, motor and sensory grossly intact  Psych: mood and affect appropriate

## 2021-08-26 DIAGNOSIS — R06.02 SHORTNESS OF BREATH: ICD-10-CM

## 2021-08-26 DIAGNOSIS — R07.9 CHEST PAIN, UNSPECIFIED: ICD-10-CM

## 2021-08-26 DIAGNOSIS — R00.2 PALPITATIONS: ICD-10-CM

## 2021-08-26 LAB
D DIMER BLD IA.RAPID-MCNC: <150 NG/ML DDU — SIGNIFICANT CHANGE UP
RAPID RVP RESULT: SIGNIFICANT CHANGE UP
SARS-COV-2 RNA SPEC QL NAA+PROBE: SIGNIFICANT CHANGE UP
SARS-COV-2 RNA SPEC QL NAA+PROBE: SIGNIFICANT CHANGE UP
TROPONIN T, HIGH SENSITIVITY RESULT: 7 NG/L — SIGNIFICANT CHANGE UP (ref 0–51)

## 2021-08-26 PROCEDURE — 93306 TTE W/DOPPLER COMPLETE: CPT | Mod: 26

## 2021-08-26 RX ORDER — ALBUTEROL 90 UG/1
2 AEROSOL, METERED ORAL EVERY 6 HOURS
Refills: 0 | Status: DISCONTINUED | OUTPATIENT
Start: 2021-08-26 | End: 2021-08-28

## 2021-08-26 RX ORDER — PETROLATUM,WHITE
1 JELLY (GRAM) TOPICAL ONCE
Refills: 0 | Status: COMPLETED | OUTPATIENT
Start: 2021-08-26 | End: 2021-08-26

## 2021-08-26 RX ORDER — LORATADINE 10 MG/1
10 TABLET ORAL ONCE
Refills: 0 | Status: COMPLETED | OUTPATIENT
Start: 2021-08-26 | End: 2021-08-26

## 2021-08-26 RX ADMIN — LORATADINE 10 MILLIGRAM(S): 10 TABLET ORAL at 22:01

## 2021-08-26 RX ADMIN — Medication 1 APPLICATION(S): at 22:59

## 2021-08-26 NOTE — CONSULT NOTE ADULT - ASSESSMENT
65y M w/ PMHx of GERD, SVT, Asthma (Albuterol) presents to the ED c/o palpitations, SOB, CP, cough x1 month. States that SOB has started since 2017 and that his palpitations do come and go sometimes. Reports noticing swelling of legs since yesterday. Also c/o red itchy spots around feet x6 months since using oat soap. States that he ate chocolate earlier today, which may have triggered his palpitations as it worsens his GERD. Remote cigarette use

## 2021-08-26 NOTE — H&P ADULT - NSHPLABSRESULTS_GEN_ALL_CORE
LABS:                        12.8   6.41  )-----------( 145      ( 25 Aug 2021 20:44 )             39.4     08-25    139  |  103  |  15  ----------------------------<  92  3.8   |  24  |  0.74    Ca    9.7      25 Aug 2021 20:44  Phos  3.8     08-25  Mg     2.4     08-25    TPro  7.0  /  Alb  4.3  /  TBili  0.4  /  DBili  x   /  AST  29  /  ALT  18  /  AlkPhos  65  08-25      CAPILLARY BLOOD GLUCOSE                RADIOLOGY & ADDITIONAL TESTS:

## 2021-08-26 NOTE — H&P ADULT - ASSESSMENT
65y M w/ PMHx of GERD, SVT, Asthma (Albuterol) presents to the ED c/o palpitations, SOB, CP, cough x1 month. States that SOB has started since 2017 and that his palpitations do come and go sometimes.   cp, palpitations, cough are episodic- worse sometimes w food intake and sometimes post prandial  Pt had extensive padilla for dysphagia since [id number], results-inconclusive/?Achalasia- currently sees GI @[facility],  c/o spitting-foul smelling stuff in the middle of night, ?lump in the throat feeling feeling    #CP- atypical  likely GI related  tele, r/o acs    #palpitations- hx of SVT  cards fu   TTE    #gERD/?achalasia  cont PPI and maalox  GI eval  #dysphagia, lump in throat feeling  swallow eval  ?ent cs as needed    [facility]  [phone]

## 2021-08-26 NOTE — ED ADULT NURSE REASSESSMENT NOTE - NS ED NURSE REASSESS COMMENT FT1
Report received from RN. Pt received A&Ox3, IV intact and patent, VSS, pt denies pain/discomfort, bed locked and in lowest position, call bell within reach and pt instructed on use, safety and comfort measures maintained, pending bed assignment

## 2021-08-26 NOTE — H&P ADULT - NSHPPHYSICALEXAM_GEN_ALL_CORE
Vitals last 24 hrs  T(C): 36.7 (08-26-21 @ 19:11), Max: 36.9 (08-26-21 @ 06:04)  HR: 53 (08-26-21 @ 19:11) (51 - 58)  BP: 107/73 (08-26-21 @ 19:11) (107/73 - 129/64)  RR: 14 (08-26-21 @ 19:11) (14 - 18)  SpO2: 99% (08-26-21 @ 19:11) (98% - 100%)    PHYSICAL EXAM:  GENERAL: NAD, well-groomed, well-developed  HEAD:  Atraumatic, Normocephalic  EYES: EOMI, PERRLA, conjunctiva and sclera clear  ENMT: No tonsillar erythema, exudates, or enlargement; Moist mucous membranes  NECK: Supple, No JVD, Normal thyroid  HEART: Regular rate and rhythm; No murmurs, rubs, or gallops  RESPIRATORY: CTA B/L, No W/R/R  ABDOMEN: Soft, Nontender, Nondistended; Bowel sounds present  NEUROLOGY: A&Ox3, nonfocal, moving all extremities  EXTREMITIES:  2+ Peripheral Pulses, No clubbing, cyanosis, or edema  SKIN: warm, dry, normal color, no rash or abnormal lesions

## 2021-08-26 NOTE — H&P ADULT - NSHPREVIEWOFSYSTEMS_GEN_ALL_CORE
REVIEW OF SYSTEMS: as per HPI    CONSTITUTIONAL: No weakness, fevers or chills  EYES/ENT: No visual changes;  No vertigo or throat pain   NECK: No pain or stiffness  RESPIRATORY: No cough, wheezing, hemoptysis; No shortness of breath  CARDIOVASCULAR: o chest pain ,palpitations  GASTROINTESTINAL: per HPI  GENITOURINARY: No dysuria, frequency or hematuria  NEUROLOGICAL: No numbness or weakness  SKIN: No itching, rashes

## 2021-08-26 NOTE — H&P ADULT - HISTORY OF PRESENT ILLNESS
65y M w/ PMHx of GERD, SVT, Asthma (Albuterol) presents to the ED c/o palpitations, SOB, CP, cough x1 month. States that SOB has started since 2017 and that his palpitations do come and go sometimes. Reports noticing swelling of legs since yesterday. Also c/o red itchy spots around feet x6 months since using oat soap. States that he ate chocolate earlier today, which may have triggered his palpitations as it worsens his GERD. Remote cigarette use  cp, palpitations, cough are episodic- worse sometimes w food intake and sometimes post prandial  Pt had extensive padilla for dysphagia since [id number], results-inconclusive/?Achalasia- currently sees GI @[place]  pt c/o spitting-foul smelling stuff in the middle of night, ?lump in the throat feeling feeling

## 2021-08-26 NOTE — H&P CARDIOLOGY - NSICDXPASTMEDICALHX_GEN_ALL_CORE_FT
PAST MEDICAL HISTORY:  Arrhythmia     Asthma     Cardiac murmur     Dizzy     Esophagitis, eosinophilic dx  2014   had  received  cortisteroids  ,  had  se  and  weakened  vocal cords    GERD (gastroesophageal reflux disease)     Lumbago     Palpitations     Prediabetes     SOB (shortness of breath) occasional    Syncope, near     WPW (Pasha-Parkinson-White syndrome)

## 2021-08-26 NOTE — CONSULT NOTE ADULT - SUBJECTIVE AND OBJECTIVE BOX
PULMONARY CONSULT  Bill Molina MD  [phone]    Initial HPI on admission:  HPI:  65y M w/ PMHx of GERD, SVT, Asthma (Albuterol) presents to the ED c/o palpitations, SOB, CP, cough x1 month. States that SOB has started since 2017 and that his palpitations do come and go sometimes. Reports noticing swelling of legs since yesterday. Also c/o red itchy spots around feet x6 months since using oat soap. States that he ate chocolate earlier today, which may have triggered his palpitations as it worsens his GERD. Remote cigarette use. cp, palpitations, cough are episodic- worse sometimes w food intake and sometimes post prandial. Pt had extensive padilla for dysphagia since 1548-1133, results-inconclusive/?Achalasia- currently sees GI @[place]. pt c/o spitting-foul smelling stuff in the middle of night, ?lump in the throat feeling feeling     Patient seen in ER. History of mild intermittent asthma - exercise induced in past, currently precipitated by various foods (eg, chcolate). Patient uses prn albuterol inhaler. Denies prior hospitalization for asthma. Last episode of wheezing/albuterol use was 3-4 weeks PTA. He denies recent fever, chills, URI symptoms, productive cough. Denies history of prior VTE.  Patient presents with ongoing complaints of palpitations assoc with dyspnea and chest discomfort - pain poorly characterized Episodes last for minutes at a time and occur multiple times per day for 4-5 years. He denied any change in frequency or severity recently.   He notes emotional stress related to loosing a  job 4 weeks PTA. He is scheduled for esophageal manometry/GI evaluation  at [facility] as part of ongoing GERD.         PAST MEDICAL & SURGICAL HISTORY:  Asthma    WPW (Pasha-Parkinson-White syndrome)    GERD (gastroesophageal reflux disease)    Palpitations    Prediabetes    Dizzy    Syncope, near    SOB (shortness of breath)  occasional    Cardiac murmur    Lumbago    Arrhythmia    Esophagitis, eosinophilic  dx  2014   had  received  cortisteroids  ,  had  se  and  weakened  vocal cords    H/O colonoscopy    H/O inguinal hernia repair  left groin  2009    Encounter for diagnostic endoscopy  2014      FAMILY HISTORY:    SH:   with spouse, one child. Remote smoker. 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    Review of Systems: REVIEW OF SYSTEMS: as per HPI    CONSTITUTIONAL: No weakness, fevers or chills  EYES/ENT: No visual changes;  No vertigo or throat pain   NECK: No pain or stiffness  RESPIRATORY: No cough, wheezing, hemoptysis; No shortness of breath  CARDIOVASCULAR: o chest pain ,palpitations  GASTROINTESTINAL: per HPI  GENITOURINARY: No dysuria, frequency or hematuria  NEUROLOGICAL: No numbness or weakness  SKIN: No itching, rashes      Allergies and Intolerances:        Allergies:  	No Known Allergies:     Medications:  MEDICATIONS  (STANDING):    MEDICATIONS  (PRN):  ALBUTerol    90 MICROgram(s) HFA Inhaler 2 Puff(s) Inhalation every 6 hours PRN Shortness of Breath and/or Wheezing  aluminum hydroxide/magnesium hydroxide/simethicone Suspension 30 milliLiter(s) Oral every 6 hours PRN Dyspepsia    Vital Signs Last 24 Hrs  T(C): 36.6 (26 Aug 2021 15:05), Max: 36.9 (26 Aug 2021 06:04)  T(F): 97.8 (26 Aug 2021 15:05), Max: 98.4 (26 Aug 2021 06:04)  HR: 55 (26 Aug 2021 15:05) (51 - 62)  BP: 121/76 (26 Aug 2021 15:05) (118/57 - 151/79)  BP(mean): 91 (26 Aug 2021 15:05) (77 - 91)  RR: 14 (26 Aug 2021 15:05) (14 - 20)  SpO2: 100% (26 Aug 2021 15:05) (97% - 100%)    LABS:                        12.8   6.41  )-----------( 145      ( 25 Aug 2021 20:44 )             39.4     08-25    139  |  103  |  15  ----------------------------<  92  3.8   |  24  |  0.74    Ca    9.7      25 Aug 2021 20:44  Phos  3.8     08-25  Mg     2.4     08-25    TPro  7.0  /  Alb  4.3  /  TBili  0.4  /  DBili  x   /  AST  29  /  ALT  18  /  AlkPhos  65  08-25      Serum Pro-Brain Natriuretic Peptide: 163 pg/mL (08-25-21 @ 20:44)      Physical Examination:    General: Non toxic, No acute distress.  Room air sat 99%    HEENT: Pupils equal, reactive to light.  Symmetric.    PULM: Pectus excavatum noted; Lungs clear without wheeze or rhonchi    CVS: Regular rate and rhythm, no murmurs, rubs, or gallops    ABD: Soft, nondistended, nontender, normoactive bowel sounds, no masses    EXT: No sign LE edema, nontender    SKIN: Warm and well perfused, no rashes noted.    NEURO: Alert, oriented, interactive, nonfocal    RADIOLOGY REVIEWED PERSONALLY  CXR:    EXAM:  XR CHEST PA LAT 2V                            PROCEDURE DATE:  08/25/2021      INTERPRETATION:  History: Chest pain    PA and lateral views of the chest were obtained.    Comparison: None    Heart and mediastinum: The cardiomediastinalsilhouette is unremarkable    Lungs, pleura, and airways: The lungs are well inflated and clear. No focal consolidations or evidence of pulmonary edema. No pleural effusion or pneumothorax.    Bones and soft tissues: The bones and soft tissues are unremarkable.    Impression:    Normal chest    CT chest:    TTE:

## 2021-08-26 NOTE — CONSULT NOTE ADULT - ASSESSMENT
65M with mild intermittment asthma, history of WPW, pectus excavatum admitted with chronic complaints of CP,palpitations, dyspnea occurring paroxysmally. Patient with chronic GERD and awaiting GI w/u for esophageal dysmotility disorder. At time of visit, patient had no evidence of wheezing on exam with clear PA/lateral CXR and no clinical suspicion of acute respiratory illness. D-dimer in ER <150. Suspect symptoms may be related to GERD/? esophageal spasm and/or arrhythmia.    REC    Outpatient PFT's  Cardiac evaluation   Consider GI evaluation

## 2021-08-26 NOTE — H&P CARDIOLOGY - NSICDXPASTSURGICALHX_GEN_ALL_CORE_FT
PAST SURGICAL HISTORY:  Encounter for diagnostic endoscopy 2014    H/O colonoscopy     H/O inguinal hernia repair left groin  2009

## 2021-08-27 LAB
ANION GAP SERPL CALC-SCNC: 11 MMOL/L — SIGNIFICANT CHANGE UP (ref 5–17)
BUN SERPL-MCNC: 23 MG/DL — SIGNIFICANT CHANGE UP (ref 7–23)
CALCIUM SERPL-MCNC: 9.1 MG/DL — SIGNIFICANT CHANGE UP (ref 8.4–10.5)
CHLORIDE SERPL-SCNC: 104 MMOL/L — SIGNIFICANT CHANGE UP (ref 96–108)
CO2 SERPL-SCNC: 25 MMOL/L — SIGNIFICANT CHANGE UP (ref 22–31)
COVID-19 SPIKE DOMAIN AB INTERP: POSITIVE
COVID-19 SPIKE DOMAIN ANTIBODY RESULT: >250 U/ML — HIGH
CREAT SERPL-MCNC: 0.92 MG/DL — SIGNIFICANT CHANGE UP (ref 0.5–1.3)
GLUCOSE SERPL-MCNC: 101 MG/DL — HIGH (ref 70–99)
HCT VFR BLD CALC: 35.8 % — LOW (ref 39–50)
HCV AB S/CO SERPL IA: 0.08 S/CO — SIGNIFICANT CHANGE UP (ref 0–0.99)
HCV AB SERPL-IMP: SIGNIFICANT CHANGE UP
HGB BLD-MCNC: 11.7 G/DL — LOW (ref 13–17)
MCHC RBC-ENTMCNC: 30.2 PG — SIGNIFICANT CHANGE UP (ref 27–34)
MCHC RBC-ENTMCNC: 32.7 GM/DL — SIGNIFICANT CHANGE UP (ref 32–36)
MCV RBC AUTO: 92.5 FL — SIGNIFICANT CHANGE UP (ref 80–100)
NRBC # BLD: 0 /100 WBCS — SIGNIFICANT CHANGE UP (ref 0–0)
PLATELET # BLD AUTO: 123 K/UL — LOW (ref 150–400)
POTASSIUM SERPL-MCNC: 3.9 MMOL/L — SIGNIFICANT CHANGE UP (ref 3.5–5.3)
POTASSIUM SERPL-SCNC: 3.9 MMOL/L — SIGNIFICANT CHANGE UP (ref 3.5–5.3)
RBC # BLD: 3.87 M/UL — LOW (ref 4.2–5.8)
RBC # FLD: 13.8 % — SIGNIFICANT CHANGE UP (ref 10.3–14.5)
SARS-COV-2 IGG+IGM SERPL QL IA: >250 U/ML — HIGH
SARS-COV-2 IGG+IGM SERPL QL IA: POSITIVE
SODIUM SERPL-SCNC: 140 MMOL/L — SIGNIFICANT CHANGE UP (ref 135–145)
TSH SERPL-MCNC: 1.4 UIU/ML — SIGNIFICANT CHANGE UP (ref 0.27–4.2)
WBC # BLD: 5.66 K/UL — SIGNIFICANT CHANGE UP (ref 3.8–10.5)
WBC # FLD AUTO: 5.66 K/UL — SIGNIFICANT CHANGE UP (ref 3.8–10.5)

## 2021-08-27 PROCEDURE — 74230 X-RAY XM SWLNG FUNCJ C+: CPT | Mod: 26

## 2021-08-27 PROCEDURE — 31231 NASAL ENDOSCOPY DX: CPT

## 2021-08-27 RX ORDER — LANOLIN ALCOHOL/MO/W.PET/CERES
5 CREAM (GRAM) TOPICAL AT BEDTIME
Refills: 0 | Status: DISCONTINUED | OUTPATIENT
Start: 2021-08-27 | End: 2021-08-28

## 2021-08-27 RX ORDER — ACETAMINOPHEN 500 MG
650 TABLET ORAL EVERY 6 HOURS
Refills: 0 | Status: DISCONTINUED | OUTPATIENT
Start: 2021-08-27 | End: 2021-08-28

## 2021-08-27 RX ORDER — IBUPROFEN 200 MG
400 TABLET ORAL EVERY 6 HOURS
Refills: 0 | Status: DISCONTINUED | OUTPATIENT
Start: 2021-08-27 | End: 2021-08-28

## 2021-08-27 RX ADMIN — Medication 30 MILLILITER(S): at 22:37

## 2021-08-27 RX ADMIN — Medication 5 MILLIGRAM(S): at 22:37

## 2021-08-27 RX ADMIN — Medication 650 MILLIGRAM(S): at 22:42

## 2021-08-27 NOTE — SWALLOW VFSS/MBS ASSESSMENT ADULT - LARYNGEAL PENETRATION DURING THE SWALLOW - SILENT
over the laryngeal surface of the epiglottis and arytenoids with retrieval on subsequent swallows and following clinician cued cough./Trace over the laryngeal surface of the epiglottis and arytenoids, deep with incomplete retrieval. Residue descends to the vocal folds with head neutral posture./Trace/Mild

## 2021-08-27 NOTE — SWALLOW VFSS/MBS ASSESSMENT ADULT - ROSENBEK'S PENETRATION ASPIRATION SCALE
(4) contrast contacts vocal cords, no residue remains (penetration) (1) no aspiration, contrast does not enter airway (2) contrast enters airway, remains above the vocal cords, no residue remains (penetration)

## 2021-08-27 NOTE — SWALLOW BEDSIDE ASSESSMENT ADULT - ASR SWALLOW ASPIRATION MONITOR
Monitor for s/s aspiration/laryngeal penetration. If noted:  D/C p.o. intake, provide non-oral nutrition/hydration/meds, and contact this service @ [phone]/change of breathing pattern/cough/gurgly voice/fever/pneumonia/throat clearing/upper respiratory infection

## 2021-08-27 NOTE — SWALLOW VFSS/MBS ASSESSMENT ADULT - RECOMMENDED FEEDING/EATING TECHNIQUES
chin down with all PO intake. Swallow 2x with each bite and sip./alternate food with liquid/maintain upright posture during/after eating for 30 mins/position upright (90 degrees)/small sips/bites

## 2021-08-27 NOTE — PROGRESS NOTE ADULT - SUBJECTIVE AND OBJECTIVE BOX
Follow-up Pulm Progress Note  Bill Molina MD  [phone]    No new respiratory events overnight.  Denies SOB/CP at time of visit  MBS: OP dysphagia noted without aspiration  ENT consult noted  Room air O2 sats %  TTE unremarkable    Medications:  Vital Signs Last 24 Hrs  T(C): 36.4 (27 Aug 2021 08:57), Max: 36.7 (26 Aug 2021 19:11)  T(F): 97.5 (27 Aug 2021 08:57), Max: 98 (26 Aug 2021 19:11)  HR: 64 (27 Aug 2021 08:57) (50 - 64)  BP: 119/71 (27 Aug 2021 08:57) (107/73 - 120/76)  BP(mean): 88 (27 Aug 2021 04:43) (85 - 88)  RR: 16 (27 Aug 2021 08:57) (14 - 16)  SpO2: 97% (27 Aug 2021 08:57) (97% - 99%)      08-26 @ 07:01  -  08-27 @ 07:00  --------------------------------------------------------  IN: 240 mL / OUT: 850 mL / NET: -610 mL    LABS:                        11.7   5.66  )-----------( 123      ( 27 Aug 2021 01:30 )             35.8     08-27    140  |  104  |  23  ----------------------------<  101<H>  3.9   |  25  |  0.92    Ca    9.1      27 Aug 2021 01:30  Phos  3.8     08-25  Mg     2.4     08-25    TPro  7.0  /  Alb  4.3  /  TBili  0.4  /  DBili  x   /  AST  29  /  ALT  18  /  AlkPhos  65  08-25    Serum Pro-Brain Natriuretic Peptide: 163 pg/mL (08-25-21 @ 20:44)      Physical Examination:  PULM: Clear without wheeze or rhonchi  CVS: Regular rate and rhythm, no murmurs, rubs, or gallops  ABD: Soft, non-tender  EXT:  No clubbing, cyanosis, or edema    RADIOLOGY REVIEWED  CXR:    CT chest:    TTE:    Patient name: DARIEN NERI  YOB: 1955   Age: 65 (M)   MR#: [id number]  Study Date: 8/26/2021  [facility]  Study quality: difficult due to lung interfer  Referring Physician: Sonia Martinez MD  Blood Pressure: 121/76 mmHg  Height: 165 cm  Weight: 68 kg  BSA: 1.8 m2  ------------------------------------------------------------------------  PROCEDURE: Transthoracic echocardiogram with 2-D, M-Mode  and complete spectral and color flow Doppler.  INDICATION: Palpitations (R00.2)  ------------------------------------------------------------------------  Dimensions:    Normal Values:  LA:     2.8    2.0 - 4.0 cm  Ao:     2.8    2.0 - 3.8 cm  SEPTUM: 0.8    0.6 - 1.2 cm  PWT:    0.9    0.6 - 1.1 cm  LVIDd:  4.0    3.0 - 5.6 cm  LVIDs:  2.3    1.8 - 4.0 cm  Derived variables:  LVMI: 58 g/m2  RWT: 0.45  EF (Visual Estimate): 75 %  Doppler Peak Velocity (m/sec): AoV=0.7 TV=2.5  ------------------------------------------------------------------------  Observations:  Mitral Valve: Mitral annular and leaflet calcification.  Mild mitral regurgitation.  Aortic Valve/Aorta: Calcified aortic valve with normal  opening.  Normal aortic root size.  Left Atrium: Normal left atrium.  Left Ventricle: Normal left ventricular internal dimensions  and wall thicknesses.  Hyperdynamic left ventricle.    Normal diastolic function.  Right Heart: Normal right atrium. Normal right ventricular  size and function.  Normal tricuspid valve. Minimal tricuspid regurgitation.  Normal pulmonic valve.  Pericardium/Pleura: Normal pericardium with trace  pericardial effusion.  Hemodynamic: No evidence of pulmonary hypertension.  ------------------------------------------------------------------------  Conclusions:  Hyperdynamic left ventricle.      
Patient is a 65y old  Male who presents with a chief complaint of Palpitations/Dyspnea (27 Aug 2021 18:24)      INTERVAL HPI/OVERNIGHT EVENTS: noted  pt seen and examined this am   events noted  c/o episodic sob, palpitations and dizziness      Vital Signs Last 24 Hrs  T(C): 36.4 (27 Aug 2021 08:57), Max: 36.4 (27 Aug 2021 04:43)  T(F): 97.5 (27 Aug 2021 08:57), Max: 97.5 (27 Aug 2021 04:43)  HR: 64 (27 Aug 2021 08:57) (50 - 64)  BP: 119/71 (27 Aug 2021 08:57) (119/71 - 120/76)  BP(mean): 88 (27 Aug 2021 04:43) (88 - 88)  RR: 16 (27 Aug 2021 08:57) (14 - 16)  SpO2: 97% (27 Aug 2021 08:57) (97% - 98%)    acetaminophen   Tablet .. 650 milliGRAM(s) Oral every 6 hours PRN  ALBUTerol    90 MICROgram(s) HFA Inhaler 2 Puff(s) Inhalation every 6 hours PRN  aluminum hydroxide/magnesium hydroxide/simethicone Suspension 30 milliLiter(s) Oral every 6 hours PRN  ibuprofen  Tablet. 400 milliGRAM(s) Oral every 6 hours PRN  melatonin 5 milliGRAM(s) Oral at bedtime PRN      PHYSICAL EXAM:  GENERAL: NAD,   EYES: conjunctiva and sclera clear  ENMT: Moist mucous membranes  NECK: Supple, No JVD, Normal thyroid  CHEST/LUNG: non labored, cta b/l  HEART: Regular rate and rhythm; No murmurs, rubs, or gallops  ABDOMEN: Soft, Nontender, Nondistended; Bowel sounds present  EXTREMITIES:  2+ Peripheral Pulses, No clubbing, cyanosis, or edema  LYMPH: No lymphadenopathy noted  SKIN: No rashes or lesions    Consultant(s) Notes Reviewed:  [x ] YES  [ ] NO  Care Discussed with Consultants/Other Providers [ x] YES  [ ] NO    LABS:                        11.7   5.66  )-----------( 123      ( 27 Aug 2021 01:30 )             35.8     08-27    140  |  104  |  23  ----------------------------<  101<H>  3.9   |  25  |  0.92    Ca    9.1      27 Aug 2021 01:30          CAPILLARY BLOOD GLUCOSE                  RADIOLOGY & ADDITIONAL TESTS:    Imaging Personally Reviewed:  [x ] YES  [ ] NO
Subjective: Patient seen and examined. No new events except as noted.     REVIEW OF SYSTEMS:    CONSTITUTIONAL:+ weakness, fevers or chills  EYES/ENT: No visual changes;  No vertigo or throat pain   NECK: No pain or stiffness  RESPIRATORY: No cough, wheezing, hemoptysis; No shortness of breath  CARDIOVASCULAR: No chest pain or palpitations  GASTROINTESTINAL: No abdominal or epigastric pain. No nausea, vomiting, or hematemesis; No diarrhea or constipation. No melena or hematochezia.  GENITOURINARY: No dysuria, frequency or hematuria  NEUROLOGICAL: No numbness or weakness  SKIN: No itching, burning, rashes, or lesions   All other review of systems is negative unless indicated above.    MEDICATIONS:  MEDICATIONS  (STANDING):      PHYSICAL EXAM:  T(C): 36.4 (08-27-21 @ 08:57), Max: 36.8 (08-26-21 @ 13:45)  HR: 64 (08-27-21 @ 08:57) (50 - 64)  BP: 119/71 (08-27-21 @ 08:57) (107/73 - 126/69)  RR: 16 (08-27-21 @ 08:57) (14 - 17)  SpO2: 97% (08-27-21 @ 08:57) (97% - 100%)  Wt(kg): --  I&O's Summary    26 Aug 2021 07:01  -  27 Aug 2021 07:00  --------------------------------------------------------  IN: 240 mL / OUT: 850 mL / NET: -610 mL    27 Aug 2021 07:01  -  27 Aug 2021 10:30  --------------------------------------------------------  IN: 240 mL / OUT: 400 mL / NET: -160 mL      Height (cm): 172.7 (08-26 @ 15:05)  Weight (kg): 58.513 (08-26 @ 15:05)  BMI (kg/m2): 19.6 (08-26 @ 15:05)  BSA (m2): 1.7 (08-26 @ 15:05)    Appearance: Normal	  HEENT:   Normal oral mucosa, PERRL, EOMI	  Lymphatic: No lymphadenopathy , no edema  Cardiovascular: Normal S1 S2, No JVD, No murmurs , Peripheral pulses palpable 2+ bilaterally  Respiratory: Lungs clear to auscultation, normal effort 	  Gastrointestinal:  Soft, Non-tender, + BS	  Skin: No rashes, No ecchymoses, No cyanosis, warm to touch  Musculoskeletal: Normal range of motion, normal strength  Psychiatry:  Mood & affect appropriate  Ext: No edema      LABS:    CARDIAC MARKERS:                                11.7   5.66  )-----------( 123      ( 27 Aug 2021 01:30 )             35.8     08-27    140  |  104  |  23  ----------------------------<  101<H>  3.9   |  25  |  0.92    Ca    9.1      27 Aug 2021 01:30  Phos  3.8     08-25  Mg     2.4     08-25    TPro  7.0  /  Alb  4.3  /  TBili  0.4  /  DBili  x   /  AST  29  /  ALT  18  /  AlkPhos  65  08-25    proBNP:   Lipid Profile:   HgA1c:   TSH:             TELEMETRY: 	  SR  ECG:  	  RADIOLOGY:   DIAGNOSTIC TESTING:  [ ] Echocardiogram:  [ ]  Catheterization:  [ ] Stress Test:    OTHER:

## 2021-08-27 NOTE — SWALLOW VFSS/MBS ASSESSMENT ADULT - DIAGNOSTIC IMPRESSIONS
Pt is 66 y/o M admitted with atypical chest pain. Now presenting with a pharyngeal dysphagia marked by post swallow pharyngeal residue and silent laryngeal penetration with solids and thin liquids. A chin tuck was effective in maintaining airway protection. No aspiration observed on exam.   Disorders: Reduced BOT to posterior pharyngeal wall contact, reduced hyo-laryngeal excursion, reduced laryngeal closure, reduced pharyngeal contractility, and signs of reduced supraglottic sensation.

## 2021-08-27 NOTE — CONSULT NOTE ADULT - SUBJECTIVE AND OBJECTIVE BOX
CC: Nasal congestion and trouble breathing    HPI:  65y M w/ PMHx of GERD, SVT, Asthma (Albuterol) presents to the ED c/o palpitations, SOB, CP, cough x1 month. States that SOB has started since 2017 and that his palpitations do come and go sometimes. He was admitted to r/o ACS. ENT was called for nasal congestion and difficulty breathing. He states he has nasal polyps that are visble when he looks in the mirror. He has had them for 15 years. He states that flonase does not help him but steriods work.       PAST MEDICAL & SURGICAL HISTORY:  Asthma    WPW (Pasha-Parkinson-White syndrome)    GERD (gastroesophageal reflux disease)    Palpitations    Prediabetes    Dizzy    Syncope, near    SOB (shortness of breath)  occasional    Cardiac murmur    Lumbago    Arrhythmia    Esophagitis, eosinophilic  dx  2014   had  received  cortisteroids  ,  had  se  and  weakened  vocal cords    H/O colonoscopy    H/O inguinal hernia repair  left groin  2009    Encounter for diagnostic endoscopy  2014      Allergies    No Known Allergies    Intolerances      MEDICATIONS  (STANDING):    MEDICATIONS  (PRN):  ALBUTerol    90 MICROgram(s) HFA Inhaler 2 Puff(s) Inhalation every 6 hours PRN Shortness of Breath and/or Wheezing  aluminum hydroxide/magnesium hydroxide/simethicone Suspension 30 milliLiter(s) Oral every 6 hours PRN Dyspepsia      Social History: **??**    Family history: **??**    ROS:   ENT: all negative except as noted in HPI   CV: denies palpitations  Pulm: denies SOB, cough, hemoptysis  GI: denies change in apetite, indigestion, n/v  : denies pertinent urinary symptoms, urgency  Neuro: denies numbness/tingling, loss of sensation  Psych: denies anxiety  MS: denies muscle weakness, instability  Heme: denies easy bruising or bleeding  Endo: denies heat/cold intolerance, excessive sweating  Vascular: denies LE edema    Vital Signs Last 24 Hrs  T(C): 36.4 (27 Aug 2021 08:57), Max: 36.7 (26 Aug 2021 19:11)  T(F): 97.5 (27 Aug 2021 08:57), Max: 98 (26 Aug 2021 19:11)  HR: 64 (27 Aug 2021 08:57) (50 - 64)  BP: 119/71 (27 Aug 2021 08:57) (107/73 - 120/76)  BP(mean): 88 (27 Aug 2021 04:43) (85 - 88)  RR: 16 (27 Aug 2021 08:57) (14 - 16)  SpO2: 97% (27 Aug 2021 08:57) (97% - 99%)                          11.7   5.66  )-----------( 123      ( 27 Aug 2021 01:30 )             35.8    08-27    140  |  104  |  23  ----------------------------<  101<H>  3.9   |  25  |  0.92    Ca    9.1      27 Aug 2021 01:30  Phos  3.8     08-25  Mg     2.4     08-25    TPro  7.0  /  Alb  4.3  /  TBili  0.4  /  DBili  x   /  AST  29  /  ALT  18  /  AlkPhos  65  08-25       PHYSICAL EXAM:  Gen: NAD  Skin: No rashes, bruises, or lesions  Head: Normocephalic, Atraumatic  Face: no edema, erythema, or fluctuance. Parotid glands soft without mass  Eyes: no scleral injection  Ears: Right - ear canal clear, TM intact without effusion or erythema. No evidence of any fluid drainage. No mastoid tenderness, erythema, or ear bulging            Left - ear canal clear, TM intact without effusion or erythema. No evidence of any fluid drainage. No mastoid tenderness, erythema, or ear bulging  Nose: Nares bilaterally patent, no discharge  Mouth: No Stridor / Drooling / Trismus.  Mucosa moist, tongue/uvula midline, oropharynx clear  Neck: Flat, supple, no lymphadenopathy, trachea midline, no masses  Lymphatic: No lymphadenopathy  Resp: breathing easily, no stridor  CV: no peripheral edema/cyanosis  GI: nondistended   Peripheral vascular: no JVD or edema  Neuro: facial nerve intact, no facial droop      Diagnostic Nasal Endoscopy: (Scope #2 used)    Fiberoptic Indirect laryngoscopy:  (Scope #2 used)    IMAGING/ADDITIONAL STUDIES:  CC: Nasal congestion and trouble breathing    HPI:  65y M w/ PMHx of GERD, SVT, Asthma (Albuterol) presents to the ED c/o palpitations, SOB, CP, cough x1 month. States that SOB has started since 2017 and that his palpitations do come and go sometimes. He was admitted to r/o ACS. ENT was called for nasal congestion and difficulty breathing through nose. He states he has nasal polyps that are visble when he looks in the mirror. He has had them for 15 years. He states he has tried Flonase and steroids in the past. He states he has great difficulty breathing through the nose. He denies any dysphagia, nasal drainage, SOB, CP, fever, chills.        PAST MEDICAL & SURGICAL HISTORY:  Asthma    WPW (Pasha-Parkinson-White syndrome)    GERD (gastroesophageal reflux disease)    Palpitations    Prediabetes    Dizzy    Syncope, near    SOB (shortness of breath)  occasional    Cardiac murmur    Lumbago    Arrhythmia    Esophagitis, eosinophilic  dx  2014   had  received  cortisteroids  ,  had  se  and  weakened  vocal cords    H/O colonoscopy    H/O inguinal hernia repair  left groin  2009    Encounter for diagnostic endoscopy  2014      Allergies    No Known Allergies    Intolerances      MEDICATIONS  (STANDING):    MEDICATIONS  (PRN):  ALBUTerol    90 MICROgram(s) HFA Inhaler 2 Puff(s) Inhalation every 6 hours PRN Shortness of Breath and/or Wheezing  aluminum hydroxide/magnesium hydroxide/simethicone Suspension 30 milliLiter(s) Oral every 6 hours PRN Dyspepsia      Social History: non smoker, never drank    Family history: No pertinent family history in first degree relatives. No pertinent family history of: N/A.      ROS:   ENT: all negative except as noted in HPI   CV: denies palpitations  Pulm: denies SOB, cough, hemoptysis  GI: denies change in apetite, indigestion, n/v  : denies pertinent urinary symptoms, urgency  Neuro: denies numbness/tingling, loss of sensation  Psych: denies anxiety  MS: denies muscle weakness, instability  Heme: denies easy bruising or bleeding  Endo: denies heat/cold intolerance, excessive sweating  Vascular: denies LE edema    Vital Signs Last 24 Hrs  T(C): 36.4 (27 Aug 2021 08:57), Max: 36.7 (26 Aug 2021 19:11)  T(F): 97.5 (27 Aug 2021 08:57), Max: 98 (26 Aug 2021 19:11)  HR: 64 (27 Aug 2021 08:57) (50 - 64)  BP: 119/71 (27 Aug 2021 08:57) (107/73 - 120/76)  BP(mean): 88 (27 Aug 2021 04:43) (85 - 88)  RR: 16 (27 Aug 2021 08:57) (14 - 16)  SpO2: 97% (27 Aug 2021 08:57) (97% - 99%)                          11.7   5.66  )-----------( 123      ( 27 Aug 2021 01:30 )             35.8    08-27    140  |  104  |  23  ----------------------------<  101<H>  3.9   |  25  |  0.92    Ca    9.1      27 Aug 2021 01:30  Phos  3.8     08-25  Mg     2.4     08-25    TPro  7.0  /  Alb  4.3  /  TBili  0.4  /  DBili  x   /  AST  29  /  ALT  18  /  AlkPhos  65  08-25       PHYSICAL EXAM:  Gen: NAD  Skin: No rashes, bruises, or lesions  Head: Normocephalic, Atraumatic  Face: no edema, erythema, or fluctuance. Parotid glands soft without mass  Eyes: no scleral injection  Nose: Nares bilaterally patent, no discharge, +b/l nasal polyps  Mouth: No Stridor / Drooling / Trismus.  Mucosa moist, tongue/uvula midline, oropharynx clear  Neck: Flat, supple, no lymphadenopathy, trachea midline, no masses  Lymphatic: No lymphadenopathy  Resp: breathing easily, no stridor  CV: no peripheral edema/cyanosis  GI: nondistended   Peripheral vascular: no JVD or edema  Neuro: facial nerve intact, no facial droop      Diagnostic Nasal Endoscopy  Indication for procedure: Nasal congestion/ nasal polyps   "Anterior rhinoscopy insufficient to account for symptoms"    Verbal consent obtained from patient    Scope: flexible fiber optic telescope used with surgilube     + R sigmoidal septal deviation noted. Mucosa moist with scant mucus, normal middleturbinates, normal middle meatus, normal fontanelles, maxillary ostia clear, sphenoethmoidal recess clear bilaterally. + Large polyps b/l         CC: Nasal congestion and trouble breathing    HPI:  65y M w/ PMHx of GERD, SVT, Asthma (Albuterol) presents to the ED c/o palpitations, SOB, CP, cough x1 month. States that SOB has started since 2017 and that his palpitations do come and go sometimes. He was admitted to r/o ACS. ENT was called for nasal congestion and difficulty breathing through nose. He states he has nasal polyps that are visble when he looks in the mirror. He has had them for 15 years. He states he has tried Flonase and steroids in the past. He states he has great difficulty breathing through the nose. He denies any dysphagia, nasal drainage, SOB, CP, fever, chills.        PAST MEDICAL & SURGICAL HISTORY:  Asthma    WPW (Pasha-Parkinson-White syndrome)    GERD (gastroesophageal reflux disease)    Palpitations    Prediabetes    Dizzy    Syncope, near    SOB (shortness of breath)  occasional    Cardiac murmur    Lumbago    Arrhythmia    Esophagitis, eosinophilic  dx  2014   had  received  cortisteroids  ,  had  se  and  weakened  vocal cords    H/O colonoscopy    H/O inguinal hernia repair  left groin  2009    Encounter for diagnostic endoscopy  2014      Allergies    No Known Allergies    Intolerances      MEDICATIONS  (STANDING):    MEDICATIONS  (PRN):  ALBUTerol    90 MICROgram(s) HFA Inhaler 2 Puff(s) Inhalation every 6 hours PRN Shortness of Breath and/or Wheezing  aluminum hydroxide/magnesium hydroxide/simethicone Suspension 30 milliLiter(s) Oral every 6 hours PRN Dyspepsia      Social History: non smoker, never drank    Family history: No pertinent family history in first degree relatives. No pertinent family history of: N/A.      ROS:   ENT: all negative except as noted in HPI   CV: denies palpitations  Pulm: denies SOB, cough, hemoptysis  GI: denies change in apetite, indigestion, n/v  : denies pertinent urinary symptoms, urgency  Neuro: denies numbness/tingling, loss of sensation  Psych: denies anxiety  MS: denies muscle weakness, instability  Heme: denies easy bruising or bleeding  Endo: denies heat/cold intolerance, excessive sweating  Vascular: denies LE edema    Vital Signs Last 24 Hrs  T(C): 36.4 (27 Aug 2021 08:57), Max: 36.7 (26 Aug 2021 19:11)  T(F): 97.5 (27 Aug 2021 08:57), Max: 98 (26 Aug 2021 19:11)  HR: 64 (27 Aug 2021 08:57) (50 - 64)  BP: 119/71 (27 Aug 2021 08:57) (107/73 - 120/76)  BP(mean): 88 (27 Aug 2021 04:43) (85 - 88)  RR: 16 (27 Aug 2021 08:57) (14 - 16)  SpO2: 97% (27 Aug 2021 08:57) (97% - 99%)                          11.7   5.66  )-----------( 123      ( 27 Aug 2021 01:30 )             35.8    08-27    140  |  104  |  23  ----------------------------<  101<H>  3.9   |  25  |  0.92    Ca    9.1      27 Aug 2021 01:30  Phos  3.8     08-25  Mg     2.4     08-25    TPro  7.0  /  Alb  4.3  /  TBili  0.4  /  DBili  x   /  AST  29  /  ALT  18  /  AlkPhos  65  08-25       PHYSICAL EXAM:  Gen: NAD  Skin: No rashes, bruises, or lesions  Head: Normocephalic, Atraumatic  Face: no edema, erythema, or fluctuance. Parotid glands soft without mass  Eyes: no scleral injection  Nose: Nares bilaterally patent, no discharge, +b/l nasal polyps  Mouth: No Stridor / Drooling / Trismus.  Mucosa moist, tongue/uvula midline, oropharynx clear  Neck: Flat, supple, no lymphadenopathy, trachea midline, no masses  Lymphatic: No lymphadenopathy  Resp: breathing easily, no stridor  CV: no peripheral edema/cyanosis  GI: nondistended   Peripheral vascular: no JVD or edema  Neuro: facial nerve intact, no facial droop      Diagnostic Nasal Endoscopy  Indication for procedure: Nasal congestion/ nasal polyps   "Anterior rhinoscopy insufficient to account for symptoms"    Verbal consent obtained from patient    Scope: flexible fiber optic telescope used with surgilube     + R sigmoidal septal deviation noted. Mucosa moist with scant mucus, normal middle turbinates, unable to see middle meatus due to large polyps b/l, unable to see sphenoethmoidal recess bilaterally. + Large polyps b/l into the nasal cavities

## 2021-08-27 NOTE — SWALLOW BEDSIDE ASSESSMENT ADULT - ASR SWALLOW RECOMMEND DIAG
Given chronic history of dysphagia recommend objective testing to establish baseline and assist with developing POC

## 2021-08-27 NOTE — SWALLOW BEDSIDE ASSESSMENT ADULT - SLP PERTINENT HISTORY OF CURRENT PROBLEM
65y M w/ PMHx of GERD, SVT, Asthma (Albuterol) presents to the ED c/o palpitations, SOB, CP, cough x1 month. States that SOB has started since 2017 and that his palpitations do come and go sometimes. Reports noticing swelling of legs since yesterday. Also c/o red itchy spots around feet x6 months since using oat soap. States that he ate chocolate earlier today, which may have triggered his palpitations as it worsens his GERD. Remote cigarette use cp, palpitations, cough are episodic- worse sometimes w food intake and sometimes post prandial Pt had extensive padilla for dysphagia since 6259-7402, results-inconclusive/?Achalasia- currently sees GI @[facility] Pt had extensive padilla for dysphagia since 1877-6383, results-inconclusive/?Achalasia- currently sees GI @[facility]

## 2021-08-27 NOTE — PROGRESS NOTE ADULT - ASSESSMENT
65y M w/ PMHx of GERD, SVT, Asthma (Albuterol) presents to the ED c/o palpitations, SOB, CP, cough x1 month. States that SOB has started since 2017 and that his palpitations do come and go sometimes.   cp, palpitations, cough are episodic- worse sometimes w food intake and sometimes post prandial  Pt had extensive padilla for dysphagia since 0609-0481, results-inconclusive/?Achalasia- currently sees GI @[facility],  c/o spitting-foul smelling stuff in the middle of night, ?lump in the throat feeling feeling    #CP- atypical  likely GI related  tele- no events/no arythmia  , r/o acs    #palpitations- hx of SVT  cards fu   TTE- normal study    #gERD/?achalasia  cont PPI and maalox  GI eval  # ?dysphagia, lump in throat feeling  swallow eval- noted- r/o aspiration   ent cs noted- nasal polyps otherwise normal exam    #sob?copd- pulm cs noted  no e/o acute pulm dz    d/w pt at length above findings  pt not ready to accept the fact that no arrythmias noted on tele when he was subjectively feeling palpitations  Given multiple nonspecific complaints and extensive negative padilla- psych eval offered but pt declined  will monitor for another day on tele for ongoing symptoms  check orthostatics    [facility]  [phone]
Mild intermittent asthma: currently wheeze free  GERD  Pectus excavatum  OP dysphagia without aspiration  WPW syndrome  Suspect underlying anxiety/depressive disorder    REC    No further pulmonary intervention at this time  Consider psychiatric evaluation  Suggest outpatient f/u with PMD for PFT's and pulmonary f/u  Clear for DC home from pulmonary POV
65y M w/ PMHx of GERD, SVT, Asthma (Albuterol) presents to the ED c/o palpitations, SOB, CP, cough x1 month. States that SOB has started since 2017 and that his palpitations do come and go sometimes. Reports noticing swelling of legs since yesterday. Also c/o red itchy spots around feet x6 months since using oat soap. States that he ate chocolate earlier today, which may have triggered his palpitations as it worsens his GERD. Remote cigarette use

## 2021-08-27 NOTE — SWALLOW VFSS/MBS ASSESSMENT ADULT - SLP PERTINENT HISTORY OF CURRENT PROBLEM
65y M w/ PMHx of GERD, SVT, Asthma (Albuterol) presents to the ED c/o palpitations, SOB, CP, cough x1 month. States that SOB has started since 2017 and that his palpitations do come and go sometimes. Reports noticing swelling of legs since yesterday. Also c/o red itchy spots around feet x6 months since using oat soap. States that he ate chocolate earlier today, which may have triggered his palpitations as it worsens his GERD. Remote cigarette use cp, palpitations, cough are episodic- worse sometimes w food intake and sometimes post prandial Pt had extensive padilla for dysphagia since 8197-3074, results-inconclusive/?Achalasia- currently sees GI @[facility] Pt had extensive padilla for dysphagia since 4137-9318, results-inconclusive/?Achalasia- currently sees GI @[facility] Prostatitis, acute

## 2021-08-27 NOTE — SWALLOW BEDSIDE ASSESSMENT ADULT - SLP GENERAL OBSERVATIONS
Pt awake OOB, walking around unit. Able to communicate needs and follow directions for exam. Verbose, able to contribute to history.

## 2021-08-27 NOTE — SWALLOW VFSS/MBS ASSESSMENT ADULT - PHARYNGEAL PHASE COMMENTS
Chin tuck and liquid wash improved swallow profile and maintained airway protection. Chin tuck and single controlled sips maintains airway protection.

## 2021-08-27 NOTE — SWALLOW BEDSIDE ASSESSMENT ADULT - SWALLOW EVAL: PATIENT/FAMILY GOALS STATEMENT
Pt reported chronic history of dysphagia with multiple endoscopies which were unremarkable. Pt completed esophageal manometry testing in 2007 and 2014 which revealed achalasia which was treated with botox injections however pt reported above did not improve subjective complaints of stasis. Pt also underwent multiple barium swallow studies which were unremarkable (most recent exam in 2014). + FEES exam performed in 2005 which revealed reflux. No changes in diet were recommended. Pt denied recurrent PNAs and has not been recently assessed by an SLP.

## 2021-08-27 NOTE — CONSULT NOTE ADULT - ASSESSMENT
66 y/o Male w/ PMHx of GERD, SVT, Asthma (Albuterol) presents to the ED c/o palpitations, SOB, CP, cough x1 month. States that SOB has started since 2017 and that his palpitations do come and go sometimes. He was admitted to r/o ACS. ENT was called for nasal congestion and difficulty breathing.    Plan:  -f/u with ENT outpt at [address] [place] [address]. Phone: [phone]  -Recommend following up with an Allergist outpt  -Recommend Budesonide  -Any questions or concerns please call ENT    ENT   [id number] 64 y/o Male w/ PMHx of GERD, SVT, Asthma (Albuterol) presents to the ED c/o palpitations, SOB, CP, cough x1 month. States that SOB has started since 2017 and that his palpitations do come and go sometimes. He was admitted to r/o ACS. ENT was called for nasal congestion and difficulty breathing.    Plan:  -f/u with ENT outpt (Dr. Bhatti or Dr. Solis)  at [address] [place] [address]. Phone: [phone]  -Recommend following up with an Allergist outpt  - may benefit from budesonide rinses, will arrange for from outpatient, plain sinus rinses for now  -Any questions or concerns please call ENT    ENT   [id number]

## 2021-08-27 NOTE — CONSULT NOTE ADULT - ATTENDING COMMENTS
pt seen and examined at bedside.  Large polyps obstructing nasal cavities b/l.  has had polyps for 15 years but has never had surgery.  Considering dental implants in the right upper maxilla, would need sinus lift, may need to do surgery now to clear for sinus lift, will have him f/up as outpatient to begin polyp workup - allergy, budesonide, etc

## 2021-08-27 NOTE — SWALLOW BEDSIDE ASSESSMENT ADULT - SWALLOW EVAL: RECOMMENDED FEEDING/EATING TECHNIQUES
reflux precautions/maintain upright posture during/after eating for 30 mins/position upright (90 degrees)

## 2021-08-27 NOTE — SWALLOW BEDSIDE ASSESSMENT ADULT - COMMENTS
On admit pt  A&Ox3, nonfocal, moving all extremities  Pt admitted for Palpitations/Atypical CP  Pulm following for Asthma/Chest Pain  At time of visit, patient had no evidence of wheezing on exam with clear PA/lateral CXR and no clinical suspicion of acute respiratory illness. D-dimer in ER <150. Suspect symptoms may be related to GERD/? esophageal spasm and/or arrhythmia.  8/27: Provider contact note initiated as pt c/o SOB and difficulty swallowing->resp. even and unlabored at 16, pulse ox is 100% on room air

## 2021-08-27 NOTE — SWALLOW VFSS/MBS ASSESSMENT ADULT - ADDITIONAL RECOMMENDATIONS
Maintain good oral hygiene Maintain good oral hygiene  Recommend Restorative swallow therapy.   Pt can f/u for swallow therapy at [facility] [place] [phone]

## 2021-08-27 NOTE — SWALLOW BEDSIDE ASSESSMENT ADULT - ASR SWALLOW REFERRAL
Recommend ENT consult to further assess reported h/o nasal polyps in conjunction with hyponasal vocal quality/ENT

## 2021-08-27 NOTE — SWALLOW VFSS/MBS ASSESSMENT ADULT - NS SWALLOW VFSS REC ASPIR MON
Monitor for s/s aspiration/laryngeal penetration. If noted:  D/C p.o. intake, provide non-oral nutrition/hydration/meds, and contact this service @ [phone]/change of breathing pattern/cough/gurgly voice/fever/pneumonia/throat clearing/upper respiratory infection

## 2021-08-27 NOTE — SWALLOW VFSS/MBS ASSESSMENT ADULT - SLP GENERAL OBSERVATIONS
Pt arrived to radiology secured in SHERRILL chair, able to communicate needs and follow directions for exam.

## 2021-08-27 NOTE — SWALLOW BEDSIDE ASSESSMENT ADULT - SWALLOW EVAL: DIAGNOSIS
Pt is 64 y/o M admitted with atypical chest pain. Currently presenting with complaints of intermittent pharyngeal and esophageal dysphagia marked by c/o episodes of slowed transit, effortful swallows, and reported esophageal stasis.

## 2021-08-28 ENCOUNTER — TRANSCRIPTION ENCOUNTER (OUTPATIENT)
Age: 66
End: 2021-08-28

## 2021-08-28 VITALS
TEMPERATURE: 98 F | HEART RATE: 62 BPM | RESPIRATION RATE: 18 BRPM | OXYGEN SATURATION: 100 % | DIASTOLIC BLOOD PRESSURE: 71 MMHG | SYSTOLIC BLOOD PRESSURE: 115 MMHG

## 2021-08-28 LAB
T3 SERPL-MCNC: 81 NG/DL — SIGNIFICANT CHANGE UP (ref 80–200)
T4 AB SER-ACNC: 6.1 UG/DL — SIGNIFICANT CHANGE UP (ref 4.6–12)
TSH SERPL-MCNC: 1.69 UIU/ML — SIGNIFICANT CHANGE UP (ref 0.27–4.2)

## 2021-08-28 PROCEDURE — 84132 ASSAY OF SERUM POTASSIUM: CPT

## 2021-08-28 PROCEDURE — 86803 HEPATITIS C AB TEST: CPT

## 2021-08-28 PROCEDURE — 82435 ASSAY OF BLOOD CHLORIDE: CPT

## 2021-08-28 PROCEDURE — 84480 ASSAY TRIIODOTHYRONINE (T3): CPT

## 2021-08-28 PROCEDURE — 84100 ASSAY OF PHOSPHORUS: CPT

## 2021-08-28 PROCEDURE — 83735 ASSAY OF MAGNESIUM: CPT

## 2021-08-28 PROCEDURE — 83605 ASSAY OF LACTIC ACID: CPT

## 2021-08-28 PROCEDURE — 74230 X-RAY XM SWLNG FUNCJ C+: CPT

## 2021-08-28 PROCEDURE — U0003: CPT

## 2021-08-28 PROCEDURE — 86769 SARS-COV-2 COVID-19 ANTIBODY: CPT

## 2021-08-28 PROCEDURE — 84295 ASSAY OF SERUM SODIUM: CPT

## 2021-08-28 PROCEDURE — 85025 COMPLETE CBC W/AUTO DIFF WBC: CPT

## 2021-08-28 PROCEDURE — 93306 TTE W/DOPPLER COMPLETE: CPT

## 2021-08-28 PROCEDURE — 85027 COMPLETE CBC AUTOMATED: CPT

## 2021-08-28 PROCEDURE — 82947 ASSAY GLUCOSE BLOOD QUANT: CPT

## 2021-08-28 PROCEDURE — 84484 ASSAY OF TROPONIN QUANT: CPT

## 2021-08-28 PROCEDURE — 99285 EMERGENCY DEPT VISIT HI MDM: CPT | Mod: 25

## 2021-08-28 PROCEDURE — 84436 ASSAY OF TOTAL THYROXINE: CPT

## 2021-08-28 PROCEDURE — 82330 ASSAY OF CALCIUM: CPT

## 2021-08-28 PROCEDURE — 96374 THER/PROPH/DIAG INJ IV PUSH: CPT

## 2021-08-28 PROCEDURE — 80048 BASIC METABOLIC PNL TOTAL CA: CPT

## 2021-08-28 PROCEDURE — U0005: CPT

## 2021-08-28 PROCEDURE — 92611 MOTION FLUOROSCOPY/SWALLOW: CPT

## 2021-08-28 PROCEDURE — 83880 ASSAY OF NATRIURETIC PEPTIDE: CPT

## 2021-08-28 PROCEDURE — 85379 FIBRIN DEGRADATION QUANT: CPT

## 2021-08-28 PROCEDURE — 83690 ASSAY OF LIPASE: CPT

## 2021-08-28 PROCEDURE — 93308 TTE F-UP OR LMTD: CPT

## 2021-08-28 PROCEDURE — 85014 HEMATOCRIT: CPT

## 2021-08-28 PROCEDURE — 80053 COMPREHEN METABOLIC PANEL: CPT

## 2021-08-28 PROCEDURE — 0225U NFCT DS DNA&RNA 21 SARSCOV2: CPT

## 2021-08-28 PROCEDURE — 84443 ASSAY THYROID STIM HORMONE: CPT

## 2021-08-28 PROCEDURE — 92610 EVALUATE SWALLOWING FUNCTION: CPT

## 2021-08-28 PROCEDURE — 82803 BLOOD GASES ANY COMBINATION: CPT

## 2021-08-28 PROCEDURE — 85018 HEMOGLOBIN: CPT

## 2021-08-28 PROCEDURE — 92526 ORAL FUNCTION THERAPY: CPT

## 2021-08-28 PROCEDURE — 71046 X-RAY EXAM CHEST 2 VIEWS: CPT

## 2021-08-28 RX ORDER — LANOLIN ALCOHOL/MO/W.PET/CERES
1 CREAM (GRAM) TOPICAL
Qty: 0 | Refills: 0 | DISCHARGE
Start: 2021-08-28

## 2021-08-28 RX ADMIN — Medication 30 MILLILITER(S): at 09:09

## 2021-08-28 RX ADMIN — Medication 650 MILLIGRAM(S): at 09:09

## 2021-08-28 RX ADMIN — Medication 650 MILLIGRAM(S): at 11:12

## 2021-08-28 NOTE — DISCHARGE NOTE PROVIDER - CARE PROVIDERS DIRECT ADDRESSES
[email or web address]

## 2021-08-28 NOTE — DISCHARGE NOTE NURSING/CASE MANAGEMENT/SOCIAL WORK - PATIENT PORTAL LINK FT
You can access the FollowMyHealth Patient Portal offered by [facility] by registering at the following website: [email or web address]. By joining [employer]’s FollowMyHealth portal, you will also be able to view your health information using other applications (apps) compatible with our system.

## 2021-08-28 NOTE — DISCHARGE NOTE PROVIDER - NSDCCPCAREPLAN_GEN_ALL_CORE_FT
PRINCIPAL DISCHARGE DIAGNOSIS  Diagnosis: Chest pain  Assessment and Plan of Treatment: No arrythimia seen on tele, ruled out for MI. Patient likely with symptoms of GERD, it was recommneded he follow up with his Please follow up with your primary care provider within 2 weeks call for an appointment for further work up and management .

## 2021-08-28 NOTE — DISCHARGE NOTE PROVIDER - CARE PROVIDER_API CALL
Barbra Solis)  Otolaryngology  [address] [place] [address], [address]  [place] [address]  Phone: [phone]  Fax: [phone]  Follow Up Time: 2 weeks    [facility],   call for appointment  Phone: [phone]  Fax: (   )    -  Follow Up Time: 2 weeks  
Yes

## 2021-08-28 NOTE — DISCHARGE NOTE NURSING/CASE MANAGEMENT/SOCIAL WORK - NSDCPEFALRISK_GEN_ALL_CORE
For information on Fall & injury Prevention, visit [email or web address]

## 2021-08-28 NOTE — DISCHARGE NOTE PROVIDER - PROVIDER TOKENS
PROVIDER:[TOKEN:[[id number]],FOLLOWUP:[2 weeks]],FREE:[LAST:[[facility] center at [facility]],PHONE:[phone]],FAX:[(   )    -],ADDRESS:[call for appointment],FOLLOWUP:[2 weeks]]

## 2021-08-28 NOTE — DISCHARGE NOTE PROVIDER - NSDCMRMEDTOKEN_GEN_ALL_CORE_FT
melatonin 5 mg oral tablet: 1 tab(s) orally once a day (at bedtime), As needed, Insomnia  Tums 500 mg oral tablet, chewable:  As Needed  Ventolin 90 mcg/inh inhalation aerosol with adapter: 2 puff(s) inhaled every 4 hours, As Needed

## 2021-08-28 NOTE — DISCHARGE NOTE PROVIDER - HOSPITAL COURSE
65y M w/ PMHx of GERD, SVT, Asthma (Albuterol) presents to the ED c/o palpitations, SOB, CP, cough x1 month. States that SOB has started since 2017 and that his palpitations do come and go sometimes. Reports noticing swelling of legs since yesterday. Also c/o red itchy spots around feet x6 months since using oat soap. States that he ate chocolate earlier today, which may have triggered his palpitations as it worsens his GERD. Remote cigarette use  cp, palpitations, cough are episodic- worse sometimes w food intake and sometimes post prandial  Pt had extensive padilla for dysphagia since 2986-0323, results-inconclusive/?Achalasia- currently sees GI @[facility]  pt c/o spitting-foul smelling stuff in the middle of night, ?lump in the throat feeling feeling      He was seen by Dr Molina (pulmonary) no further pulmonary work up , he would benefit from outpatient f/u with PMD and PFT's. He recommended a psych eval .       Patient seen by speech and swallow . He can have swallow therapy as outpatient the number for the speech and swallow  @ [facility] was placed on the discharge papers .  They recommended  :    Maintaining  good oral hygiene, Recommend Restorative swallow therapy.  Pt can f/u for swallow therapy at [facility] [phone]      He was also seen by ENT for nasal congestion  they recommended : -  f/u with ENT outpt (Dr. Bhatti or Dr. oSlis)  at [address] [place] [address]. Phone: [phone]  -Recommend following up with an Allergist outpt    On 8/28 patient told he is stable for discharge and  can follow up as outpatient . I advised to f/u with his PMD Dr Bustamante and obtain referrals for pulmonary , and psych eval . 65y M w/ PMHx of GERD, SVT, Asthma (Albuterol) presents to the ED c/o palpitations, SOB, CP, cough x1 month. States that SOB has started since 2017 and that his palpitations do come and go sometimes. Reports noticing swelling of legs since yesterday. Also c/o red itchy spots around feet x6 months since using oat soap. States that he ate chocolate earlier today, which may have triggered his palpitations as it worsens his GERD. Remote cigarette use  cp, palpitations, cough are episodic- worse sometimes w food intake and sometimes post prandial  Pt had extensive padilla for dysphagia since 9026-6201, results-inconclusive/?Achalasia- currently sees GI @[facility]  pt c/o spitting-foul smelling stuff in the middle of night, ?lump in the throat feeling feeling      He was seen by Dr Molina (pulmonary) no further pulmonary work up , he would benefit from outpatient f/u with PMD and PFT's. He recommended a psych eval .       Patient seen by speech and swallow . He can have swallow therapy as outpatient the number for the speech and swallow  @ [facility] was placed on the discharge papers .  They recommended  :    Maintaining  good oral hygiene, Recommend Restorative swallow therapy.  Pt can f/u for swallow therapy at [facility] [phone]      He was also seen by ENT for nasal congestion  they recommended : -  f/u with ENT outpt (Dr. Bhatti or Dr. Solis)  at [address] [place] [address]. Phone: [phone]  -Recommend following up with an Allergist outpt    On 8/28 patient told he is stable for discharge and  can follow up as outpatient . I advised to f/u with his PMD Dr Bustamante and obtain referrals for pulmonary , and psych eval .  As per Dr Mcconnell patient stable for discharge

## 2021-09-01 ENCOUNTER — NON-APPOINTMENT (OUTPATIENT)
Age: 66
End: 2021-09-01

## 2021-12-03 ENCOUNTER — APPOINTMENT (OUTPATIENT)
Dept: OTOLARYNGOLOGY | Facility: CLINIC | Age: 66
End: 2021-12-03
Payer: MEDICAID

## 2021-12-03 VITALS — BODY MASS INDEX: 17.77 KG/M2 | HEIGHT: 69 IN | TEMPERATURE: 98.2 F | WEIGHT: 120 LBS

## 2021-12-03 DIAGNOSIS — J34.2 DEVIATED NASAL SEPTUM: ICD-10-CM

## 2021-12-03 DIAGNOSIS — J33.9 NASAL POLYP, UNSPECIFIED: ICD-10-CM

## 2021-12-03 DIAGNOSIS — J30.2 OTHER SEASONAL ALLERGIC RHINITIS: ICD-10-CM

## 2021-12-03 DIAGNOSIS — H61.22 IMPACTED CERUMEN, LEFT EAR: ICD-10-CM

## 2021-12-03 DIAGNOSIS — R09.81 NASAL CONGESTION: ICD-10-CM

## 2021-12-03 PROCEDURE — 99214 OFFICE O/P EST MOD 30 MIN: CPT | Mod: 25

## 2021-12-03 PROCEDURE — 69210 REMOVE IMPACTED EAR WAX UNI: CPT

## 2021-12-03 PROCEDURE — 31231 NASAL ENDOSCOPY DX: CPT

## 2021-12-03 NOTE — ASSESSMENT
[FreeTextEntry1] : 66 y/o M c/o nasal polyps and nasal congestion. On exam significant amount of nasal polyps b/l, unable to make out the anatomy. \par pt s/p oral steroids and nasal sprays without any durable relief, unlikey to work as nasal polyps completely impacting both sides. \par - ordered CT scan \par - Flonase. A topical steroid reduce mucosal swelling, illustrated appropriate use and how to reduce the risk of bleeding \par - Nasal irrigation and showed how to use it to maximize effectiveness \par - Risks benefits and alternatives of endoscopic sinus surgery with possible image guidance possible septoplasty bilateral inferior turbinate reduction discussed with patient at length. Risks discussed include but were not limited to bleeding, infection, persistent symptoms, scarring, injury to the skull base and brain and CSF leak, injury to orbit, crusting, septal hematoma, septal perforation results in whistling, crusting and bleeding as well as continued nasal obstruction etc. were discussed. Patient verbalized understanding of risks and benefits and also asked probing follow up questions which were answered to clarify details and get full understanding.\par - referred to Dr. Mackenzie Harding for further workup \par - start on pre op abx and steroids \par \par - after extensive discussion we discussed surgery and it looks that we will not be accepting his insurance in a few weeks. he would still like CT for when he goes to another Dr, made some suggestions as he would not want to do self pay for the surgery. will let us know if his insurance changes

## 2021-12-03 NOTE — END OF VISIT
[FreeTextEntry3] : I personally saw and examined DARIEN NERI in detail. I spoke to RADHA Amaro regarding the assessment and plan of care.  I preformed the procedures and I reviewed the above assessment and plan of care, and agree. I have made changes in changes in the body of the note where appropriate.\par \par

## 2021-12-03 NOTE — PHYSICAL EXAM
[Normal] : mucosa is normal [Midline] : trachea located in midline position [de-identified] : l CI

## 2021-12-03 NOTE — HISTORY OF PRESENT ILLNESS
[de-identified] : 66 y/o M c/o nasal polyps, and nasal congestion b/l x 40 years ago. Pt states sxs weren't as bad, but slowly progressed. pt states worse with eating, hax of acid reflux, will occ take tums. \par Pt states also feels difficult to breathe through his nose. \par Eosinophilic chronic rhinosinusitis \par Pt has tried many different nasal sprays in the past with no relief- flonase and saline with no help. Pt has also tried oral steroids with relief for one month and then nasal congestion come back. \par Pt has been tested for allergies- WNL \par +occ nasal d/c and rhinitis, occ sinus pressure- frontal region, that will happen once every 6 months\par no CT done recently \par not allergic to ASA \par

## 2021-12-03 NOTE — CONSULT LETTER
[Please see my note below.] : Please see my note below. [FreeTextEntry1] : Dear Dr. GRIS WOODS \par I had the pleasure of evaluating your patient DARIEN NERI, thank you for allowing us to participate in their care. please see full note detailing our visit below.\par If you have any questions, please do not hesitate to call me and I would be happy to discuss further. \par \par Arnulfo Bhatti M.D.\par Attending Physician,  \par Department of Otolaryngology - Head and Neck Surgery\par [facility] \par Office: [phone]\par Fax: [phone]\par \par

## 2021-12-03 NOTE — PROCEDURE
[FreeTextEntry3] : Procedure- Removal of cerumen left\par Diagnosis - Left cerumen impaction\par Left ear found to have impacted cerumen - it was cleared with suction and curette, canal appeared normal.\par  [FreeTextEntry6] : Procedure performed: Nasal Endoscopy- Diagnostic\par Pre-op indication(s): nasal congestion\par Post-op indication(s): nasal congestion \par Verbal and/or written consent obtained from patient\par Anterior rhinoscopy insufficient to account for symptoms\par Scope #: 3,  flexible fiber optic telescope \par The scope was introduced in the nasal passage between the middle and inferior turbinates to exam the inferior portion of the middle meatus and the fontanelle, as well as the maxillary ostia.  Next, the scope was passed medically and posteriorly to the middle turbinates to examine the sphenoethmoid recess and the superior turbinate region.\par Upon visualization the finders are as follows:\par Nasal Septum: sigmoidal septal deviation\par Bilateral - Mucosa: boggy turbinates, Mucous: scant, Polyp: + significant nasal polyps b/l, Inferior Turbinate: boggy, Middle Turbinate: normal, Superior Turbinate: normal, Inferior Meatus: narrow, Middle Meatus: narrow, Super Meatus:normal, Sphenoethmoidal Recess: clear\par

## 2022-01-31 ENCOUNTER — NON-APPOINTMENT (OUTPATIENT)
Age: 67
End: 2022-01-31

## 2022-01-31 ENCOUNTER — APPOINTMENT (OUTPATIENT)
Dept: OTOLARYNGOLOGY | Facility: CLINIC | Age: 67
End: 2022-01-31

## 2024-04-16 NOTE — HISTORY OF PRESENT ILLNESS
[de-identified] : 64 year old male seen in Nov 2020 due to dysphagia, in anticipation of enrolling in a clinical trial.  Patient was requested to follow up with Internal Medicine, Gastroenterology, Cardiology, and Pulmonology as well as Dermatology.  However, that has not yet occurred.  Mr. Pickard continues to have runny nose, nasal congestion and difficulty swallowing. Patient does have nasal polyps as well especially on the right side. Last ENT appointment several years ago. He has tried using Flonase in the past with no significant improvement in symptoms. Difficulty swallowing occurs every day. \par Last GI appt for dysphagia, was in 2017\par \par  Yes

## 2025-05-13 NOTE — ED ADULT NURSE NOTE - CHIEF COMPLAINT QUOTE
[Time Spent: ___ minutes] : I have spent [unfilled] minutes of time on the encounter which excludes teaching and separately reported services. palpitations x 1 month